# Patient Record
Sex: FEMALE | Race: WHITE | NOT HISPANIC OR LATINO | Employment: OTHER | URBAN - METROPOLITAN AREA
[De-identification: names, ages, dates, MRNs, and addresses within clinical notes are randomized per-mention and may not be internally consistent; named-entity substitution may affect disease eponyms.]

---

## 2021-10-08 ENCOUNTER — HOSPITAL ENCOUNTER (OUTPATIENT)
Dept: NON INVASIVE DIAGNOSTICS | Facility: CLINIC | Age: 76
Discharge: HOME | End: 2021-10-08
Payer: COMMERCIAL

## 2021-10-08 DIAGNOSIS — Z00.00 ENCOUNTER FOR GENERAL ADULT MEDICAL EXAMINATION WITHOUT ABNORMAL FINDINGS: ICD-10-CM

## 2021-10-08 PROCEDURE — 93922 UPR/L XTREMITY ART 2 LEVELS: CPT

## 2021-10-09 PROCEDURE — VASC: Performed by: SURGERY

## 2024-11-10 ENCOUNTER — HOSPITAL ENCOUNTER (INPATIENT)
Facility: HOSPITAL | Age: 79
LOS: 6 days | Discharge: HOME HEALTH | DRG: 637 | End: 2024-11-16
Attending: EMERGENCY MEDICINE | Admitting: INTERNAL MEDICINE
Payer: COMMERCIAL

## 2024-11-10 ENCOUNTER — APPOINTMENT (EMERGENCY)
Dept: RADIOLOGY | Facility: HOSPITAL | Age: 79
DRG: 637 | End: 2024-11-10
Payer: COMMERCIAL

## 2024-11-10 ENCOUNTER — APPOINTMENT (INPATIENT)
Dept: RADIOLOGY | Facility: HOSPITAL | Age: 79
DRG: 637 | End: 2024-11-10
Payer: COMMERCIAL

## 2024-11-10 DIAGNOSIS — R07.9 EXERTIONAL CHEST PAIN: ICD-10-CM

## 2024-11-10 DIAGNOSIS — R41.82 ALTERED MENTAL STATUS: ICD-10-CM

## 2024-11-10 DIAGNOSIS — E16.2 HYPOGLYCEMIA: ICD-10-CM

## 2024-11-10 DIAGNOSIS — R29.90 STROKE-LIKE SYMPTOMS: ICD-10-CM

## 2024-11-10 DIAGNOSIS — Z78.9 NATIONAL INSTITUTES OF HEALTH (NIH) STROKE SCALE LEVEL OF CONSCIOUSNESS SCORE 0, ALERT; KEENLY RESPONSIVE: Primary | ICD-10-CM

## 2024-11-10 DIAGNOSIS — G93.41 METABOLIC ENCEPHALOPATHY: ICD-10-CM

## 2024-11-10 DIAGNOSIS — I25.119 CORONARY ARTERY DISEASE INVOLVING NATIVE CORONARY ARTERY OF NATIVE HEART WITH ANGINA PECTORIS (HCC): ICD-10-CM

## 2024-11-10 PROBLEM — R63.4 WEIGHT LOSS: Status: ACTIVE | Noted: 2024-11-10

## 2024-11-10 PROBLEM — I10 HYPERTENSION, ESSENTIAL: Status: ACTIVE | Noted: 2022-02-06

## 2024-11-10 PROBLEM — J45.909 ASTHMA: Status: ACTIVE | Noted: 2023-06-19

## 2024-11-10 PROBLEM — D64.9 ANEMIA: Status: ACTIVE | Noted: 2024-11-10

## 2024-11-10 PROBLEM — E78.5 HYPERLIPIDEMIA: Status: ACTIVE | Noted: 2024-11-10

## 2024-11-10 PROBLEM — F32.A DEPRESSION: Status: ACTIVE | Noted: 2024-11-10

## 2024-11-10 PROBLEM — Z86.79 HISTORY OF SUBDURAL HEMATOMA: Status: ACTIVE | Noted: 2023-06-21

## 2024-11-10 PROBLEM — I47.10 PAROXYSMAL SVT (SUPRAVENTRICULAR TACHYCARDIA) (HCC): Status: ACTIVE | Noted: 2023-06-21

## 2024-11-10 PROBLEM — E11.9 TYPE 2 DIABETES MELLITUS, WITH LONG-TERM CURRENT USE OF INSULIN (HCC): Status: ACTIVE | Noted: 2024-11-10

## 2024-11-10 PROBLEM — Z79.4 TYPE 2 DIABETES MELLITUS, WITH LONG-TERM CURRENT USE OF INSULIN (HCC): Status: ACTIVE | Noted: 2024-11-10

## 2024-11-10 PROBLEM — K21.9 ACID REFLUX: Status: ACTIVE | Noted: 2023-06-21

## 2024-11-10 LAB
2HR DELTA HS TROPONIN: 12 NG/L
4HR DELTA HS TROPONIN: 13 NG/L
ANION GAP SERPL CALCULATED.3IONS-SCNC: 5 MMOL/L (ref 4–13)
APTT PPP: 27 SECONDS (ref 23–34)
ATRIAL RATE: 99 BPM
BUN SERPL-MCNC: 17 MG/DL (ref 5–25)
CALCIUM SERPL-MCNC: 7.4 MG/DL (ref 8.4–10.2)
CARDIAC TROPONIN I PNL SERPL HS: 11 NG/L
CARDIAC TROPONIN I PNL SERPL HS: 23 NG/L
CARDIAC TROPONIN I PNL SERPL HS: 24 NG/L
CHLORIDE SERPL-SCNC: 103 MMOL/L (ref 96–108)
CO2 SERPL-SCNC: 25 MMOL/L (ref 21–32)
CREAT SERPL-MCNC: 0.89 MG/DL (ref 0.6–1.3)
ERYTHROCYTE [DISTWIDTH] IN BLOOD BY AUTOMATED COUNT: 13.9 % (ref 11.6–15.1)
GFR SERPL CREATININE-BSD FRML MDRD: 62 ML/MIN/1.73SQ M
GLUCOSE SERPL-MCNC: 123 MG/DL (ref 65–140)
GLUCOSE SERPL-MCNC: 170 MG/DL (ref 65–140)
GLUCOSE SERPL-MCNC: 198 MG/DL (ref 65–140)
GLUCOSE SERPL-MCNC: 234 MG/DL (ref 65–140)
GLUCOSE SERPL-MCNC: 238 MG/DL (ref 65–140)
GLUCOSE SERPL-MCNC: 318 MG/DL (ref 65–140)
GLUCOSE SERPL-MCNC: 37 MG/DL (ref 65–140)
GLUCOSE SERPL-MCNC: 39 MG/DL (ref 65–140)
HCT VFR BLD AUTO: 30.8 % (ref 34.8–46.1)
HGB BLD-MCNC: 10 G/DL (ref 11.5–15.4)
INR PPP: 1.08 (ref 0.85–1.19)
MCH RBC QN AUTO: 29.3 PG (ref 26.8–34.3)
MCHC RBC AUTO-ENTMCNC: 32.5 G/DL (ref 31.4–37.4)
MCV RBC AUTO: 90 FL (ref 82–98)
P AXIS: 65 DEGREES
PLATELET # BLD AUTO: 325 THOUSANDS/UL (ref 149–390)
PMV BLD AUTO: 9.5 FL (ref 8.9–12.7)
POTASSIUM SERPL-SCNC: 4.4 MMOL/L (ref 3.5–5.3)
PR INTERVAL: 142 MS
PROTHROMBIN TIME: 14.3 SECONDS (ref 12.3–15)
QRS AXIS: 64 DEGREES
QRSD INTERVAL: 94 MS
QT INTERVAL: 314 MS
QTC INTERVAL: 402 MS
RBC # BLD AUTO: 3.41 MILLION/UL (ref 3.81–5.12)
SODIUM SERPL-SCNC: 133 MMOL/L (ref 135–147)
T WAVE AXIS: 217 DEGREES
VENTRICULAR RATE: 99 BPM
WBC # BLD AUTO: 7.63 THOUSAND/UL (ref 4.31–10.16)

## 2024-11-10 PROCEDURE — 70498 CT ANGIOGRAPHY NECK: CPT

## 2024-11-10 PROCEDURE — 93010 ELECTROCARDIOGRAM REPORT: CPT | Performed by: INTERNAL MEDICINE

## 2024-11-10 PROCEDURE — 74176 CT ABD & PELVIS W/O CONTRAST: CPT

## 2024-11-10 PROCEDURE — 71250 CT THORAX DX C-: CPT

## 2024-11-10 PROCEDURE — 70496 CT ANGIOGRAPHY HEAD: CPT

## 2024-11-10 PROCEDURE — 85027 COMPLETE CBC AUTOMATED: CPT | Performed by: EMERGENCY MEDICINE

## 2024-11-10 PROCEDURE — 82948 REAGENT STRIP/BLOOD GLUCOSE: CPT

## 2024-11-10 PROCEDURE — 84484 ASSAY OF TROPONIN QUANT: CPT | Performed by: EMERGENCY MEDICINE

## 2024-11-10 PROCEDURE — 36415 COLL VENOUS BLD VENIPUNCTURE: CPT | Performed by: EMERGENCY MEDICINE

## 2024-11-10 PROCEDURE — 96365 THER/PROPH/DIAG IV INF INIT: CPT

## 2024-11-10 PROCEDURE — 85610 PROTHROMBIN TIME: CPT | Performed by: EMERGENCY MEDICINE

## 2024-11-10 PROCEDURE — 80048 BASIC METABOLIC PNL TOTAL CA: CPT | Performed by: EMERGENCY MEDICINE

## 2024-11-10 PROCEDURE — 84484 ASSAY OF TROPONIN QUANT: CPT | Performed by: INTERNAL MEDICINE

## 2024-11-10 PROCEDURE — 99223 1ST HOSP IP/OBS HIGH 75: CPT | Performed by: PSYCHIATRY & NEUROLOGY

## 2024-11-10 PROCEDURE — 96375 TX/PRO/DX INJ NEW DRUG ADDON: CPT

## 2024-11-10 PROCEDURE — 85730 THROMBOPLASTIN TIME PARTIAL: CPT | Performed by: EMERGENCY MEDICINE

## 2024-11-10 PROCEDURE — 93005 ELECTROCARDIOGRAM TRACING: CPT

## 2024-11-10 PROCEDURE — 99223 1ST HOSP IP/OBS HIGH 75: CPT | Performed by: INTERNAL MEDICINE

## 2024-11-10 PROCEDURE — 99285 EMERGENCY DEPT VISIT HI MDM: CPT

## 2024-11-10 PROCEDURE — 99291 CRITICAL CARE FIRST HOUR: CPT | Performed by: EMERGENCY MEDICINE

## 2024-11-10 PROCEDURE — 99222 1ST HOSP IP/OBS MODERATE 55: CPT | Performed by: INTERNAL MEDICINE

## 2024-11-10 RX ORDER — ASPIRIN 81 MG/1
81 TABLET, CHEWABLE ORAL DAILY
Status: DISCONTINUED | OUTPATIENT
Start: 2024-11-10 | End: 2024-11-16 | Stop reason: HOSPADM

## 2024-11-10 RX ORDER — ASPIRIN 81 MG/1
243 TABLET ORAL ONCE
Status: COMPLETED | OUTPATIENT
Start: 2024-11-10 | End: 2024-11-10

## 2024-11-10 RX ORDER — BUPROPION HYDROCHLORIDE 150 MG/1
150 TABLET ORAL DAILY
COMMUNITY

## 2024-11-10 RX ORDER — DEXTROSE MONOHYDRATE AND SODIUM CHLORIDE 5; .9 G/100ML; G/100ML
75 INJECTION, SOLUTION INTRAVENOUS CONTINUOUS
Status: DISCONTINUED | OUTPATIENT
Start: 2024-11-10 | End: 2024-11-11

## 2024-11-10 RX ORDER — OLANZAPINE 15 MG/1
15 TABLET ORAL
COMMUNITY

## 2024-11-10 RX ORDER — BUPROPION HYDROCHLORIDE 150 MG/1
150 TABLET ORAL DAILY
Status: DISCONTINUED | OUTPATIENT
Start: 2024-11-11 | End: 2024-11-16 | Stop reason: HOSPADM

## 2024-11-10 RX ORDER — LISINOPRIL 10 MG/1
10 TABLET ORAL DAILY
COMMUNITY
End: 2024-11-16

## 2024-11-10 RX ORDER — ATORVASTATIN CALCIUM 40 MG/1
40 TABLET, FILM COATED ORAL DAILY
COMMUNITY

## 2024-11-10 RX ORDER — DEXTROSE MONOHYDRATE AND SODIUM CHLORIDE 5; .9 G/100ML; G/100ML
75 INJECTION, SOLUTION INTRAVENOUS CONTINUOUS
Status: DISCONTINUED | OUTPATIENT
Start: 2024-11-10 | End: 2024-11-10

## 2024-11-10 RX ORDER — DEXTROSE MONOHYDRATE 25 G/50ML
INJECTION, SOLUTION INTRAVENOUS
Status: COMPLETED
Start: 2024-11-10 | End: 2024-11-10

## 2024-11-10 RX ORDER — DEXTROSE MONOHYDRATE 25 G/50ML
25 INJECTION, SOLUTION INTRAVENOUS ONCE
Status: COMPLETED | OUTPATIENT
Start: 2024-11-10 | End: 2024-11-10

## 2024-11-10 RX ORDER — ONDANSETRON 2 MG/ML
4 INJECTION INTRAMUSCULAR; INTRAVENOUS ONCE
Status: COMPLETED | OUTPATIENT
Start: 2024-11-10 | End: 2024-11-10

## 2024-11-10 RX ORDER — MELOXICAM 7.5 MG/1
7.5 TABLET ORAL DAILY PRN
COMMUNITY

## 2024-11-10 RX ORDER — MONTELUKAST SODIUM 10 MG/1
10 TABLET ORAL
COMMUNITY

## 2024-11-10 RX ORDER — MONTELUKAST SODIUM 10 MG/1
10 TABLET ORAL
Status: DISCONTINUED | OUTPATIENT
Start: 2024-11-10 | End: 2024-11-16 | Stop reason: HOSPADM

## 2024-11-10 RX ORDER — ACETAMINOPHEN 325 MG/1
650 TABLET ORAL EVERY 4 HOURS PRN
Status: DISCONTINUED | OUTPATIENT
Start: 2024-11-10 | End: 2024-11-16 | Stop reason: HOSPADM

## 2024-11-10 RX ORDER — ATORVASTATIN CALCIUM 40 MG/1
40 TABLET, FILM COATED ORAL EVERY EVENING
Status: DISCONTINUED | OUTPATIENT
Start: 2024-11-10 | End: 2024-11-16 | Stop reason: HOSPADM

## 2024-11-10 RX ORDER — REPAGLINIDE 1 MG/1
1 TABLET ORAL
COMMUNITY
End: 2024-11-16

## 2024-11-10 RX ORDER — HEPARIN SODIUM 5000 [USP'U]/ML
5000 INJECTION, SOLUTION INTRAVENOUS; SUBCUTANEOUS EVERY 8 HOURS SCHEDULED
Status: DISCONTINUED | OUTPATIENT
Start: 2024-11-10 | End: 2024-11-16 | Stop reason: HOSPADM

## 2024-11-10 RX ORDER — ONDANSETRON 2 MG/ML
INJECTION INTRAMUSCULAR; INTRAVENOUS
Status: COMPLETED
Start: 2024-11-10 | End: 2024-11-10

## 2024-11-10 RX ADMIN — OLANZAPINE 15 MG: 10 TABLET, FILM COATED ORAL at 23:22

## 2024-11-10 RX ADMIN — DEXTROSE MONOHYDRATE 25 ML: 25 INJECTION, SOLUTION INTRAVENOUS at 17:19

## 2024-11-10 RX ADMIN — ATORVASTATIN CALCIUM 40 MG: 40 TABLET, FILM COATED ORAL at 17:26

## 2024-11-10 RX ADMIN — DEXTROSE AND SODIUM CHLORIDE 75 ML/HR: 5; .9 INJECTION, SOLUTION INTRAVENOUS at 09:06

## 2024-11-10 RX ADMIN — MONTELUKAST 10 MG: 10 TABLET, FILM COATED ORAL at 23:22

## 2024-11-10 RX ADMIN — HEPARIN SODIUM 5000 UNITS: 5000 INJECTION INTRAVENOUS; SUBCUTANEOUS at 23:25

## 2024-11-10 RX ADMIN — ASPIRIN 243 MG: 81 TABLET, COATED ORAL at 14:39

## 2024-11-10 RX ADMIN — ASPIRIN 81 MG CHEWABLE TABLET 81 MG: 81 TABLET CHEWABLE at 13:12

## 2024-11-10 RX ADMIN — DEXTROSE MONOHYDRATE 50 ML: 25 INJECTION, SOLUTION INTRAVENOUS at 08:40

## 2024-11-10 RX ADMIN — DEXTROSE AND SODIUM CHLORIDE 40 ML/HR: 5; .9 INJECTION, SOLUTION INTRAVENOUS at 13:13

## 2024-11-10 RX ADMIN — IOHEXOL 85 ML: 350 INJECTION, SOLUTION INTRAVENOUS at 08:52

## 2024-11-10 RX ADMIN — HEPARIN SODIUM 5000 UNITS: 5000 INJECTION INTRAVENOUS; SUBCUTANEOUS at 13:13

## 2024-11-10 RX ADMIN — ONDANSETRON 4 MG: 2 INJECTION INTRAMUSCULAR; INTRAVENOUS at 08:51

## 2024-11-10 RX ADMIN — SODIUM CHLORIDE 1000 ML: 0.9 INJECTION, SOLUTION INTRAVENOUS at 08:40

## 2024-11-10 NOTE — ASSESSMENT & PLAN NOTE
Lab Results   Component Value Date    HGBA1C 8.7 (H) 08/15/2023       Recent Labs     11/10/24  0833 11/10/24  0939 11/10/24  1216   POCGLU 39* 238* 123       Blood Sugar Average: Last 72 hrs:  (P) [id number]  With hypoglycemia as above  Home regimen: Lantus 19 hs, Repaglinide 1 mg TID  Hold Lantus/Repaglinide  Blood sugars being monitored  No sliding scale insulin at present

## 2024-11-10 NOTE — ASSESSMENT & PLAN NOTE
Weight loss of around 7 lbs since some months  Progressively worsening loss of appetite, abdominal bloating  F/u CT C/A/P

## 2024-11-10 NOTE — ED PROVIDER NOTES
Time reflects when diagnosis was documented in both MDM as applicable and the Disposition within this note       Time User Action Codes Description Comment    11/10/2024  8:21 AM Marlyn Pereyra Add [Z78.9] National Institutes of Health (NIH) Stroke Scale level of consciousness score 0, alert; keenly responsive     11/10/2024  9:25 AM Bowen Phoenix [R41.82] Altered mental status     11/10/2024  9:25 AM Bowen Phoenix [E16.2] Hypoglycemia           ED Disposition       ED Disposition   Admit    Condition   Stable    Date/Time   Sun Nov 10, 2024  9:25 AM    Comment                  Assessment & Plan       Medical Decision Making  Patient 79-year-old female presenting as a stroke alert from waiting room/triage.  DDx: Altered mental status hypoglycemia, stroke alert, ACS, arrhythmia, electrolyte normality, anemia, dissection  Based on patient presentation and physical exam findings, primary concern is for prehospital stroke alert however given finding of hypoglycemia, primary concern is for hypoglycemia as main cause of mental status/presentation.  Amp of D50 given, patient started on D5 normal saline drip.  Will plan for full stroke workup, no acute findings on CT or CTA head neck.  Discussed with neurology, plans for admission to medicine for stroke pathway, continued management of hypoglycemia and MRI in the morning.    Problems Addressed:  Altered mental status: acute illness or injury  Hypoglycemia: acute illness or injury    Amount and/or Complexity of Data Reviewed  Labs: ordered. Decision-making details documented in ED Course.  Radiology: ordered.    Risk  Prescription drug management.  Decision regarding hospitalization.        ED Course as of 11/10/24 1103   [place] Nov 10, 2024   0846 POC Glucose(!!): 39  D50 amp given         Medications   dextrose 5 % and sodium chloride 0.9 % infusion (75 mL/hr Intravenous New Bag 11/10/24 0906)   dextrose 50 % IV solution 25 mL (50 mL Intravenous Given 11/10/24 0840)    ondansetron (ZOFRAN) injection 4 mg (4 mg Intravenous Given 11/10/24 0851)   sodium chloride 0.9 % bolus 1,000 mL (1,000 mL Intravenous New Bag 11/10/24 0840)   iohexol (OMNIPAQUE) 350 MG/ML injection (MULTI-DOSE) 100 mL (85 mL Intravenous Given 11/10/24 0852)       ED Risk Strat Scores                                              History of Present Illness       Chief Complaint   Patient presents with    STROKE Alert     Right side weakness. LKW 6pm yesterday       No past medical history on file.   No past surgical history on file.   No family history on file.       No existing history information found.   No existing history information found.   I have reviewed and agree with the history as documented.     HPI  Patient is 79-year-old female presenting for concerns of stroke alert from waiting room.  Patient unable to provide any history at present.  Past medical history significant for A-fib, not on any apparent blood thinners per .  Is able to tell us her name and intermittently nod to questions.  History obtained from .  Per , has had waxing waning right-sided weakness for the last several days and this morning woke up with slurred speech.  Patient is not on any reported thinners.  And route to CT, patient's blood sugar was noted to be 39.  IV placed and amp of D50 given.  Stroke protocol followed.  Review of Systems   Unable to perform ROS: Mental status change           Objective       ED Triage Vitals   Temperature Pulse Blood Pressure Respirations SpO2 Patient Position - Orthostatic VS   11/10/24 0900 11/10/24 0826 11/10/24 0826 11/10/24 0826 11/10/24 0826 11/10/24 0845   97.9 °F (36.6 °C) 88 142/73 16 99 % Lying      Temp Source Heart Rate Source BP Location FiO2 (%) Pain Score    11/10/24 0900 11/10/24 0826 -- -- --    Oral Monitor         Vitals      Date and Time Temp Pulse SpO2 Resp BP Pain Score FACES Pain Rating User   11/10/24 1000 -- 88 99 % 18 116/59 -- -- HB   11/10/24 0900  97.9 °F (36.6 °C) 97 98 % 18 133/72 -- -- HB   11/10/24 0845 -- 77 98 % 18 215/81 -- -- HB   11/10/24 0830 -- 77 98 % 18 215/81 -- -- HB   11/10/24 0826 -- 88 99 % 16 142/73 -- -- HB            Physical Exam  Vitals and nursing note reviewed.   Constitutional:       Appearance: She is ill-appearing.   HENT:      Head: Normocephalic and atraumatic.      Right Ear: Tympanic membrane, ear canal and external ear normal.      Left Ear: Tympanic membrane, ear canal and external ear normal.      Nose: Nose normal.      Mouth/Throat:      Mouth: Mucous membranes are moist.      Pharynx: Oropharynx is clear.   Eyes:      Extraocular Movements: Extraocular movements intact.      Conjunctiva/sclera: Conjunctivae normal.      Pupils: Pupils are equal, round, and reactive to light.      Comments: Patient is able to track, pupils equal and reactive, full range of EOM,   Cardiovascular:      Rate and Rhythm: Normal rate and regular rhythm.      Pulses: Normal pulses.      Heart sounds: Normal heart sounds.   Pulmonary:      Effort: Pulmonary effort is normal.      Breath sounds: Normal breath sounds.   Abdominal:      General: Abdomen is flat. Bowel sounds are normal.      Palpations: Abdomen is soft.      Tenderness: There is no abdominal tenderness. There is no guarding or rebound.   Musculoskeletal:         General: Normal range of motion.      Cervical back: Normal range of motion and neck supple.   Skin:     General: Skin is warm and dry.      Capillary Refill: Capillary refill takes less than 2 seconds.   Neurological:      Comments: Patient appears to have right upper and right lower extremity weakness however is able to move against gravity.  Difficult to ascertain if patient has any sensory deficits given patient's altered mental status.  No noted facial droop, patient was able to stick her tongue out midline and give symmetric smile.         Results Reviewed       Procedure Component Value Units Date/Time    HS Troponin  I 4hr [[id number]]     Lab Status: No result Specimen: Blood     Fingerstick Glucose (POCT) [[id number]]  (Abnormal) Collected: 11/10/24 0939    Lab Status: Final result Specimen: Blood Updated: 11/10/24 0940     POC Glucose 238 mg/dl     HS Troponin I 2hr [[id number]]     Lab Status: No result Specimen: Blood     HS Troponin 0hr (reflex protocol) [[id number]]  (Normal) Collected: 11/10/24 0903    Lab Status: Final result Specimen: Blood from Arm, Left Updated: 11/10/24 0939     hs TnI 0hr 11 ng/L     Basic metabolic panel [[id number]]  (Abnormal) Collected: 11/10/24 0903    Lab Status: Final result Specimen: Blood from Arm, Left Updated: 11/10/24 0933     Sodium 133 mmol/L      Potassium 4.4 mmol/L      Chloride 103 mmol/L      CO2 25 mmol/L      ANION GAP 5 mmol/L      BUN 17 mg/dL      Creatinine 0.89 mg/dL      Glucose 318 mg/dL      Calcium 7.4 mg/dL      eGFR 62 ml/min/1.73sq m     Narrative:      National Kidney Disease Foundation guidelines for Chronic Kidney Disease (CKD):     Stage 1 with normal or high GFR (GFR > 90 mL/min/1.73 square meters)    Stage 2 Mild CKD (GFR = 60-89 mL/min/1.73 square meters)    Stage 3A Moderate CKD (GFR = 45-59 mL/min/1.73 square meters)    Stage 3B Moderate CKD (GFR = 30-44 mL/min/1.73 square meters)    Stage 4 Severe CKD (GFR = 15-29 mL/min/1.73 square meters)    Stage 5 End Stage CKD (GFR <15 mL/min/1.73 square meters)  Note: GFR calculation is accurate only with a steady state creatinine    Protime-INR [[id number]]  (Normal) Collected: 11/10/24 0903    Lab Status: Final result Specimen: Blood from Arm, Left Updated: 11/10/24 0928     Protime 14.3 seconds      INR 1.08    Narrative:      INR Therapeutic Range    Indication                                             INR Range      Atrial Fibrillation                                               2.0-3.0  Hypercoagulable State                                    2.0.2.3  Left Ventricular Asist Device                             2.0-3.0  Mechanical Heart Valve                                  -    Aortic(with afib, MI, embolism, HF, LA enlargement,    and/or coagulopathy)                                     2.0-3.0 (2.5-3.5)     Mitral                                                             2.5-3.5  Prosthetic/Bioprosthetic Heart Valve               2.0-3.0  Venous thromboembolism (VTE: VT, PE        2.0-3.0    APTT [[id number]]  (Normal) Collected: 11/10/24 0903    Lab Status: Final result Specimen: Blood from Arm, Left Updated: 11/10/24 0928     PTT 27 seconds     CBC and Platelet [[id number]]  (Abnormal) Collected: 11/10/24 0903    Lab Status: Final result Specimen: Blood from Arm, Left Updated: 11/10/24 0923     WBC 7.63 Thousand/uL      RBC 3.41 Million/uL      Hemoglobin 10.0 g/dL      Hematocrit 30.8 %      MCV 90 fL      MCH 29.3 pg      MCHC 32.5 g/dL      RDW 13.9 %      Platelets 325 Thousands/uL      MPV 9.5 fL     Fingerstick Glucose (POCT) [[id number]]  (Abnormal) Collected: 11/10/24 0833    Lab Status: Final result Specimen: Blood Updated: 11/10/24 0834     POC Glucose 39 mg/dl             CTA stroke alert (head/neck)   Final Interpretation by Joseph Maldonado MD (11/10 0901)         1. Moderate stenoses of the origins of both vertebral arteries with the vessel is patent distally.   2. Moderate stenosis of the left subclavian artery origin approximately 50%.   3. Carotid arterial system is patent with mild atherosclerotic disease.   4. No intracranial aneurysm or major intracranial arterial stenosis.               Workstation performed: [id number]         CT stroke alert brain   Final Interpretation by Joseph Maldonado MD (11/10 0836)         1. No acute intracranial hemorrhage, mass effect or edema.   2. Right pterional craniotomy noted.            I personally discussed this study with SHAMEKA GE and Alie Gutierrez and Donta Boyd  on 11/10/2024 8:34 AM.            Workstation performed: [id number]              ECG 12 Lead Documentation Only    Date/Time: 11/10/2024 11:01 AM    Performed by: Bowen Phoenix MD  Authorized by: Bowen Phoenix MD    Indications / Diagnosis:  Stroke alert  ECG reviewed by me, the ED Provider: yes    Patient location:  ED  Interpretation:     Interpretation: normal    Rate:     ECG rate assessment: normal    Rhythm:     Rhythm: sinus rhythm    Ectopy:     Ectopy: none    QRS:     QRS axis:  Normal    QRS intervals:  Normal  Conduction:     Conduction: normal    ST segments:     ST segments:  Non-specific  T waves:     T waves: normal        ED Medication and Procedure Management   None     Patient's Medications    No medications on file     No discharge procedures on file.  ED SEPSIS DOCUMENTATION   Time reflects when diagnosis was documented in both MDM as applicable and the Disposition within this note       Time User Action Codes Description Comment    11/10/2024  8:21 AM Marlyn Pereyra Add [Z78.9] National Institutes of Health (NIH) Stroke Scale level of consciousness score 0, alert; keenly responsive     11/10/2024  9:25 AM Bowen Phoenix [R41.82] Altered mental status     11/10/2024  9:25 AM Bowen Phoenix [E16.2] Hypoglycemia                  Bowen Phoenix MD  11/10/24 1103

## 2024-11-10 NOTE — ASSESSMENT & PLAN NOTE
Complains of retrosternal chest pressure with shortness of breath and relieved with rest with occasional episodes at rest since a few months  H/o CAD - NSTEMI in 2022, cath with 50% stenosis of proximal RCA  EKG - mild inferolateral ST depression  Trop - neg x 3  Ct ASA, statin  BB when normotension permitted by neurology  F/u echo  Cardiology input appreciated

## 2024-11-10 NOTE — CONSULTS
Consultation - Neurology   Name: Tavia Eldridge 79 y.o. female I MRN: [id number]  Unit/Bed#: ED 21 I Date of Admission: 11/10/2024   Date of Service: 11/10/2024 I Hospital Day: 0   Consult to Neurology  Consult performed by: Donta Boyd DO  Consult ordered by: Ashwin Lopes DO        Physician Requesting Evaluation: Aubrie Stroud MD   Reason for Evaluation / Principal Problem: Stroke alert    Assessment & Plan  Stroke-like symptoms  79 y.o. female with pertinent PMH of type 2 diabetes, hypertension, anxiety, CAD, NSTEMI history. Stroke alert activated on 11/10/24 at 0818 for sxs concerning for stroke consisting of dysarthria, right sided weakness. LKW on 11/9/2024 at 6 PM where her  notes that the patient started having some altered mental status, the right sided weakness and sensory deficits began today however patient notes that she has had waxing and waning weakness over the past 3 days. Initial NIHSS 3 (patient missed both LOC questions and has a mild right facial droop.). Initial BP Blood Pressure: 142/73, FSBG POC Glucose: (!) 39 (RN NOTIFIED/CRIT IZABELLA). NC CTH negative and CTA H/N negative for LVO/high-grade stenosis. As a result of Symptoms resolved/clearly non disabling patient was not determined to be a candidate for thrombolysis (TNK). Given absence of IR target pt was deemed not a candidate for mechanical thrombectomy.   - Home Antiplatelet /Anticoagulants PTA: no antiplatelet or anticoagulants  - Stroke risk factors: HTN, DM, and Coronary Artery Disease  - Prior Stroke Hx: none  - Past Neurological Hx: neurosurgical procedures -right frontotemporal craniotomy from a questionable traumatic bleed    Workup:  - CTH: No acute intracranial pathology, prior right pterional craniotomy  - CTA: Moderate stenoses of the origins of both vertebral arteries, moderate stenosis of the left subclavian artery at 50%  - MRI: Pending  - JAMAL: Pending  - Labs: Hemoglobin A1c 8.7 (8/15/2023), LDL No  results in last 5 years (No results in last 5 years)    Impression: 79 y.o. female with history of  type 2 diabetes, hypertension, anxiety, CAD, NSTEMI, SVT, right pterional craniotomy currently on  no AP/AC  who presented with right-sided weakness and numbness as well as dysarthria.  NIH 3.  Blood glucose was found to be 39.  Patient also had some nausea and vomiting while at CT scanner.  Patient's symptoms are likely metabolic in nature, will rule out stroke with MRI brain.    Plan: Discussed plan with neurology attending, Dr. Mares  Admit along the stroke pathway with telemetry monitoring  Strict NPO prior to dysphagia screen   Frequent neuro checks per protocol. If there is any acute changes in exam, please obtain stat CT head and notify neurology team  BP Goals: Permissive hypertension - SBP<220/110 for first 24 hrs.   Antiplatelet agents: Load  mg, then 81 mg daily.   Anticoagulation: None  Statin: Continue home statin, Lipitor, at 40 mg daily   Labs: Hgb A1c, LDL   Brain Imaging: Obtain MRI brain without contrast  TTE, pending  Euthermic/Euglycemic  DVT PPx: Per primary, SCDs  PT/OT/ST/PMR evaluations when able  Stroke education and counseling  Rest of other care per primary team appreciated    Disposition: PT Recommendations -      I have discussed with Dr. Mares the above plan to treat pt. She agrees with the plan.    Thrombolytic Decision: Patient not a candidate. Symptoms resolved/clearly non disabling.    Recommendations for outpatient neurological follow up have yet to be determined.    History of Present Illness   Hx and PE limited by: none  Patient last known well: 11/9/24 1800  Stroke alert called: 08  Neurology time of arrival: Immediate  HPI: Tavia Eldridge is a 79 y.o. female who presents with waxing and waning right upper and lower extremity weakness and numbness for the past 3 days.  She is accompanied by her daughter and  who notes that the patient was completely normal  yesterday up until 6 PM where she started having some altered mental status and confusion.  This lasted through the night.  She then had the right-sided weakness and numbness recur this morning.  Patient was activated as a stroke alert from triage.  She was found to have a low blood sugar at 39.  While on CT scanner she had some emesis.  CT and CTA head and neck were unremarkable for any acute pathologies.  While in the patient room it was confirmed that the patient does seem to have a new right facial droop, mild.  She otherwise did not have any obvious true neurologic deficits.  Patient's  also mentions that Prandin is a new diabetic medication for her but is unsure of when she started it.  Neurologically patient seems to have a history of prior intracranial hemorrhage after questionable trauma requiring right frontotemporal craniotomy.  Otherwise she denies any other history of stroke.  Patient initially says she had a history of A-fib however upon chart review no mention of A-fib can be found, instead there was a history of SVT.      Review of Systems   Unable to perform ROS: Acuity of condition     I have reviewed the patient's PMH, PSH, Social History, Family History, Meds, and Allergies    Objective :  Temp:  [97.9 °F (36.6 °C)] 97.9 °F (36.6 °C)  HR:  [77-97] 82  BP: (116-215)/(57-81) 122/57  Resp:  [12-18] 12  SpO2:  [97 %-99 %] 98 %  O2 Device: None (Room air)    Physical Exam  Constitutional:       General: She is not in acute distress.     Appearance: Normal appearance. She is not ill-appearing.   HENT:      Head: Normocephalic.      Right Ear: External ear normal.      Left Ear: External ear normal.      Mouth/Throat:      Mouth: Mucous membranes are moist.   Eyes:      General: Lids are normal.      Extraocular Movements: Extraocular movements intact.      Conjunctiva/sclera: Conjunctivae normal.   Neurological:      Mental Status: She is alert.      Motor: Motor strength is normal.      Coordination: Coordination is intact.     Neurological Exam  Mental Status  Alert. Oriented only to person.    Cranial Nerves  CN II: Visual fields full to confrontation.  CN III, IV, VI: Extraocular movements intact bilaterally. Normal lids and orbits bilaterally.  CN V: Facial sensation is normal.  CN VII:  Right: There is central facial weakness.  CN VIII: Hearing is normal.    Motor   Strength is 5/5 throughout all four extremities.    Sensory  Patient stated decrease sensation to pinprick on the upper right extremity however also states decreased sensation to her back to the left lower extremity..    Coordination    Finger-to-nose, rapid alternating movements and heel-to-shin normal bilaterally without dysmetria.      NIHSS:  1a.Level of Consciousness: 0 = Alert   1b. LOC Questions: 2 = Answers neither correctly   1c. LOC Commands: 0 = Obeys both correctly   2. Best Gaze: 0 = Normal   3. Visual: 0 = No visual field loss   4. Facial Palsy: 1=Minor paralysis (flattened nasolabial fold, asymmetric on smiling)   5a. Motor Right Arm: 0=No drift, limb holds 90 (or 45) degrees for full 10 seconds   5b. Motor Left Arm: 0=No drift, limb holds 90 (or 45) degrees for full 10 seconds   6a. Motor Right Le=No drift, limb holds 90 (or 45) degrees for full 10 seconds   6b. Motor Left Le=No drift, limb holds 90 (or 45) degrees for full 10 seconds   7. Limb Ataxia:  0=Absent   8. Sensory: 0=Normal; no sensory loss   9. Best Language:  0=No aphasia, normal   10. Dysarthria: 0=Normal articulation   11. Extinction and Inattention (formerly Neglect): 0=No abnormality   Total Score: 3     Time NIHSS was completed: Immediate    Modified Lui Score:  1 (No significant disability. Able to carry out all usual activities, despite some symptoms)      Lab Results: I have reviewed the following results:CBC/BMP:   .     11/10/24  0903   WBC 7.63   HGB 10.0*   HCT 30.8*      SODIUM 133*   K 4.4      CO2 25   BUN 17    CREATININE 0.89   GLUC 318*        Imaging Results Review: I personally reviewed the following image studies in PACS and associated radiology reports: CT head. My interpretation of the radiology images/reports is: No acute pathology.  Other Study Results Review: No additional pertinent studies reviewed.    VTE Prophylaxis: Per primary    Code Status: Level 1 - Full Code  Advance Directive and Living Will:      Power of :    POLST:

## 2024-11-10 NOTE — ASSESSMENT & PLAN NOTE
Lipid panel (June 21, 2023): Cholesterol 275 mg/dL, Triglycerides 194 mg/dL, HDL 44 and LDL of 192  Continue statin

## 2024-11-10 NOTE — ED ATTENDING ATTESTATION
11/10/2024  I, Ashwin Lopes DO, saw and evaluated the patient. I have discussed the patient with the resident/non-physician practitioner and agree with the resident's/non-physician practitioner's findings, Plan of Care, and MDM as documented in the resident's/non-physician practitioner's note, except where noted. All available labs and Radiology studies were reviewed.  I was present for key portions of any procedure(s) performed by the resident/non-physician practitioner and I was immediately available to provide assistance.       At this point I agree with the current assessment done in the Emergency Department.  I have conducted an independent evaluation of this patient a history and physical is as follows:    ED Course  ED Course as of 11/10/24 1027   Sun Nov 10, 2024   0826 79-year-old female presents emergency department with dysarthria, symptoms starting this morning, she was also complaining that she was unable to move her right upper extremity and right lower extremity, patient does not have any complaints of pain at this point in time she is able to move right lower upper and right lower extremity very little against gravity at this point in time, intent tach sensation, she is able to follow commands, noted slurred speech on examination, no other complaints at this point in time, speaking with  she went to bed last night last known well was approximately 10 PM.    Patient is not a TNK candidate at this point in time, stroke alert called from the waiting room, CTA head and neck to be performed as well as labs,         Critical Care Time  CriticalCare Time    Date/Time: 11/10/2024 10:27 AM    Performed by: Ashwin Lopes DO  Authorized by: Ashwin Lopes DO    Critical care provider statement:     Critical care time (minutes):  60    Critical care time was exclusive of:  Separately billable procedures and treating other patients and teaching time    Critical care was necessary to treat or prevent  imminent or life-threatening deterioration of the following conditions:  CNS failure or compromise    Critical care was time spent personally by me on the following activities:  Blood draw for specimens, obtaining history from patient or surrogate, development of treatment plan with patient or surrogate, evaluation of patient's response to treatment, examination of patient, ordering and performing treatments and interventions, ordering and review of laboratory studies, ordering and review of radiographic studies, re-evaluation of patient's condition and review of old charts  Comments:      Upon my evaluation, this patient has a high probability of imminent or life-threatening deterioration due to stroke alert which required my direct attention, intervention, and personal management.     I have personally provided 60 minutes of critical care time, exclusive of procedures, teaching, and any prior time recorded by providers other than myself. Time includes review of laboratory data, radiology results, discussion with consultants, and monitoring for potential decompensation.

## 2024-11-10 NOTE — H&P
H&P - Hospitalist   Name: Tavia Eldridge 79 y.o. female I MRN: [id number]  Unit/Bed#: ED 21 I Date of Admission: 11/10/2024   Date of Service: 11/10/2024 I Hospital Day: 0     Assessment & Plan  Stroke-like symptoms  Waxing and waning right sided weakness and numbness since 3 days, confusion since last night. Recurrence of right sided weakness and numbness this morning and she was brought to the ED by her family  Blood glucose 39 on arrival  CT head - No acute intracranial hemorrhage, mass effect or edema. Right pterional craniotomy noted.  CTA head and neck - Moderate stenoses of the origins of both vertebral arteries with the vessel is patent distally. Moderate stenosis of the left subclavian artery origin approximately 50%. Carotid arterial system is patent with mild atherosclerotic disease. No intracranial aneurysm or major intracranial arterial stenosis.   Due to hypoglycemia vs CVA  Seen by neurology - received  mg, then 81 mg daily, ct home Atorvastatin 40 mg daily  MRI brain, echo, FLP, HbA1c, neurochecks  Hypoglycemia  BG 39 on arrival  Received Lantus 19 units yesterday evening  Poor oral intake since around 2 months - worsening  Received 25 ml D50 and on D5NS  Latest blood sugar 123  Regular diet  Ct D5NS at lower rate - 40 ml/hr  Monitor blood sugars   Chest pain  Complains of retrosternal chest pressure with shortness of breath and relieved with rest with occasional episodes at rest since a few months  H/o CAD - NSTEMI in 2022, cath with 50% stenosis of proximal RCA  EKG - mild inferolateral ST depression  Trop - neg x 3  Ct ASA, statin  BB when normotension permitted by neurology  F/u echo  Cardiology input appreciated  Type 2 diabetes mellitus, with long-term current use of insulin ([facility])  Lab Results   Component Value Date    HGBA1C 8.7 (H) 08/15/2023       Recent Labs     11/10/24  0833 11/10/24  0939 11/10/24  1216   POCGLU 39* 238* 123       Blood Sugar Average: Last 72 hrs:  (P)  [id number]  With hypoglycemia as above  Home regimen: Lantus 19 hs, Repaglinide 1 mg TID  Hold Lantus/Repaglinide  Blood sugars being monitored  No sliding scale insulin at present    Coronary artery disease involving native coronary artery of native heart with angina pectoris (HCC)  As above  Asthma  No exacerbation  Ct home Singulair  Albuterol prn  Primary hypertension  Home regimen: Lisinopril 10 mg daily  Hold meds in the setting of possible acute CVA requiring permissive hypertension  Will need BB as above when normotension permitted by neurology  Weight loss/loss of appetite  Weight loss of around 7 lbs since some months  Progressively worsening loss of appetite, abdominal bloating  F/u CT C/A/P  Hyperlipidemia  Ct Atorvastatin 40 mg daily  Check FLP  Anemia  Drop in Hb to 10 from 12 to 13 in 2023  W/u  Depression  Ct home meds Bupropion  mg daily, Olanzapine 15 mg daily  Paroxysmal SVT (supraventricular tachycardia) (HCC)  H/o paroxysmal SVT  Monitor on telemetry  History of subdural hematoma  H/o SDH many years ago - s/p right frontotemporal craniotomy  CT head today - no acute abnormality      VTE Pharmacologic Prophylaxis: VTE Score: 3 Moderate Risk (Score 3-4) - Pharmacological DVT Prophylaxis Ordered: heparin.  Code Status: Level 1 - Full Code   Discussion with family: Updated  () at bedside.    Anticipated Length of Stay: Patient will be admitted on an inpatient basis with an anticipated length of stay of greater than 2 midnights secondary to stroke like symptoms, hypoglycemia, weight loss, chest pain with exertion.    History of Present Illness   Chief Complaint: Right sided weakness, chest pain    Tavia Eldridge is a 79 y.o. female with a PMH of coronary artery disease, hypertension, type 2 diabetes, asthma, depression, SVT, hyperlipidemia, who presents with strokelike symptoms.    She had waxing and waning right-sided weakness and numbness since the past 3 days and  confusion since 6 PM last night.  Her right-sided weakness recurred this morning along with dysarthria and she was hence brought to the ED by her family.    She also complains of lower retrosternal chest pressure with shortness of breath, relieved with rest since a few months.  She has occasional episodes of chest pressure at rest.     She has noted weight loss of around 7 pounds since a few months and progressively worsening appetite.  She also complains of abdominal bloating.  No fever or chills.  She had some nausea and vomiting while at CT but not previously.  No abdominal pain.  No cough.     Review of Systems   Constitutional:  Positive for appetite change and unexpected weight change.   Respiratory:  Positive for shortness of breath.    Cardiovascular:  Positive for chest pain.   Gastrointestinal:         Abdominal bloating   Neurological:  Positive for speech difficulty, weakness and numbness.   Psychiatric/Behavioral:  Positive for confusion.        Historical Information   Past Medical History:   Diagnosis Date    Asthma     Coronary artery disease     Depression     Diabetes mellitus (HCC)     Hyperlipidemia     Hypertension     Subdural hematoma (HCC)     SVT (supraventricular tachycardia) (HCC)      Vaping regularly    Family history:  Nil contributory    Social History:  Marital Status: /Civil Union     Meds/Allergies   I have reviewed home medications with patient family member.  Prior to Admission medications    Medication Sig Start Date End Date Taking? Authorizing Provider   atorvastatin (LIPITOR) 40 mg tablet Take 40 mg by mouth daily   Yes Historical Provider, MD   buPROPion (WELLBUTRIN XL) 150 mg 24 hr tablet Take 150 mg by mouth daily   Yes Historical Provider, MD   lisinopril (ZESTRIL) 10 mg tablet Take 10 mg by mouth daily   Yes Historical Provider, MD   meloxicam (MOBIC) 7.5 mg tablet Take 7.5 mg by mouth daily as needed for moderate pain   Yes Historical Provider, MD   montelukast  (SINGULAIR) 10 mg tablet Take 10 mg by mouth daily at bedtime   Yes Historical Provider, MD   OLANZapine (ZyPREXA) 15 mg tablet Take 15 mg by mouth daily at bedtime   Yes Historical Provider, MD   repaglinide (PRANDIN) 1 mg tablet Take 1 mg by mouth 3 (three) times a day before meals   Yes Historical Provider, MD         Objective :  Temp:  [97.9 °F (36.6 °C)] 97.9 °F (36.6 °C)  HR:  [77-97] 84  BP: (105-215)/(51-81) 106/51  Resp:  [12-18] 16  SpO2:  [97 %-99 %] 97 %  O2 Device: None (Room air)    Physical Exam  Vitals reviewed.   Constitutional:       Appearance: She is underweight.   HENT:      Head: Normocephalic.      Nose: Nose normal.      Mouth/Throat:      Mouth: Mucous membranes are moist.   Eyes:      Extraocular Movements: Extraocular movements intact.   Cardiovascular:      Rate and Rhythm: Normal rate and regular rhythm.   Pulmonary:      Effort: Pulmonary effort is normal. No respiratory distress.      Breath sounds: Normal breath sounds. No wheezing.   Abdominal:      General: Bowel sounds are normal. There is no distension.      Palpations: Abdomen is soft.      Tenderness: There is no abdominal tenderness.   Musculoskeletal:         General: No swelling.      Cervical back: Neck supple.   Skin:     General: Skin is warm and dry.   Neurological:      Comments: Mild right facial droop            Lab Results: I have reviewed the following results:  Results from last 7 days   Lab Units 11/10/24  0903   WBC Thousand/uL 7.63   HEMOGLOBIN g/dL 10.0*   HEMATOCRIT % 30.8*   PLATELETS Thousands/uL 325     Results from last 7 days   Lab Units 11/10/24  0903   SODIUM mmol/L 133*   POTASSIUM mmol/L 4.4   CHLORIDE mmol/L 103   CO2 mmol/L 25   BUN mg/dL 17   CREATININE mg/dL 0.89   ANION GAP mmol/L 5   CALCIUM mg/dL 7.4*   GLUCOSE RANDOM mg/dL 318*     Results from last 7 days   Lab Units 11/10/24  0903   INR  1.08     Results from last 7 days   Lab Units 11/10/24  1216 11/10/24  0939 11/10/24  0833   POC GLUCOSE  mg/dl 123 238* 39*     Lab Results   Component Value Date    HGBA1C 8.7 (H) 08/15/2023    HGBA1C 9.8 (H) 06/20/2023    HGBA1C 8.1 (H) 02/06/2022           Imaging Results Review: I reviewed radiology reports from this admission including: CTA head and neck and CT head.      Administrative Statements   I have spent a total time of 76 minutes in caring for this patient on the day of the visit/encounter including Diagnostic results, Patient and family education, Impressions, Counseling / Coordination of care, Documenting in the medical record, Reviewing / ordering tests, medicine, procedures  , Obtaining or reviewing history  , and Communicating with other healthcare professionals .    ** Please Note: This note has been constructed using a voice recognition system. **

## 2024-11-10 NOTE — ASSESSMENT & PLAN NOTE
Patient presented with right-sided weakness, in the ED patient also endorsed having some ongoing constant chest pressure.  Patient's EKG shows mild inferolateral ST depressions.  Troponins negative x 3.    Patient's chest pain is likely secondary to noncardiac causes.  However, patient does have significant risk factor's with poorly controlled type 2 diabetes,  hyperlipidemia and hypertension.  She also has known CAD of her RCA.     Plan:   Telemetry to monitor for arrhythmias  Will obtain echocardiogram  Obtain pharmacologic nuclear stress test as an outpatient    Continue aspirin and statin  Once stroke workup is completed, recommend adding on beta-blocker as an antianginal

## 2024-11-10 NOTE — CONSULTS
Consultation - Cardiology   Name: Tavia Eldridge 79 y.o. female I MRN: [id number]  Unit/Bed#: ED 21 I Date of Admission: 11/10/2024   Date of Service: 11/10/2024 I Hospital Day: 0   Inpatient consult to Cardiology  Consult performed by: Tony Mojica MD  Consult ordered by: Aubrie Stroud MD        Physician Requesting Evaluation: Aubrie Stroud MD   Reason for Evaluation / Principal Problem: Chest pain    Assessment & Plan  Chest pain  Patient presented with right-sided weakness, in the ED patient also endorsed having some ongoing constant chest pressure.  Patient's EKG shows mild inferolateral ST depressions.  Troponins negative x 3.    Patient's chest pain is likely secondary to noncardiac causes.  However, patient does have significant risk factor's with poorly controlled type 2 diabetes,  hyperlipidemia and hypertension.  She also has known CAD of her RCA.     Plan:   Telemetry to monitor for arrhythmias  Will obtain echocardiogram  Obtain pharmacologic nuclear stress test as an outpatient    Continue aspirin and statin  Once stroke workup is completed, recommend adding on beta-blocker as an antianginal     Coronary artery disease involving native coronary artery of native heart with angina pectoris (HCC)  Patient has known history of CAD was admitted for NSTEMI at [facility] in [facility]22 and ultimately underwent cardiac catheterization showing 50% stenosis of the proximal RCA, this lesion was not intervened on.  She is maintained on atorvastatin 40 mg daily    Continue aspirin 81 daily  Continue atorvastatin 40 mg daily  Will obtain lipid panel  Stroke-like symptoms  Patient is currently being worked up strokelike symptoms  CTA head and neck showed bilateral vertebral artery stenosis  There is also a 50% left subclavian artery stenosis  MRI pending  Neurology following  Type 2 diabetes mellitus, with long-term current use of insulin (HCC)  Patient's most recent A1c:8.7  Current home regimen includes: Insulin 19 U  and Prandin 1 mg TID  Patient's blood glucose at the time of presentation was 39, which could have contributed to patient's presentation    Hyperlipidemia  Lipid panel (June 21, 2023): Cholesterol 275 mg/dL, Triglycerides 194 mg/dL, HDL 44 and LDL of 192  Continue statin  Hypertension, essential  On lisinopril 10 mg QD PTA  Acid reflux    Asthma    Anemia        History of Present Illness   Tavia Eldridge is a 79 y.o. female who presents with history of CAD, type 2 diabetes, hyperlipidemia, hypertension, prior history of NSTEMI (2022), and asthma. She has had episodes of right-sided weakness and numbness, initially occurring about a week ago which resolved within a few minutes and recurred again today. The weakness involved her entire right side, including her arm and leg, and was accompanied by an inability to get out of bed.     The patient also reports intermittent chest pain described as pressure, primarily in the middle of her chest, but not radiating to her jaw or arm. This pressure is 4-5/10. The chest pain occurs both at rest and during exertion and is sometimes related to swallowing.  Patient says occasionally her chest pain radiates down towards her abdomen.  The patient has also experienced a gradual decline in her ability to perform daily activities over the past six months due to increased shortness of breath and fatigue.  She denies any palpitations or episodes of syncope.     Review of Systems   Constitutional:  Positive for activity change and appetite change.   Respiratory:  Positive for shortness of breath.    Cardiovascular:  Positive for chest pain. Negative for palpitations and leg swelling.   Gastrointestinal:  Positive for abdominal pain.         Objective :  Temp:  [97.9 °F (36.6 °C)] 97.9 °F (36.6 °C)  HR:  [77-97] 80  BP: (116-215)/(57-81) 125/58  Resp:  [12-18] 12  SpO2:  [97 %-99 %] 99 %  O2 Device: None (Room air)  Orthostatic Blood Pressures      Flowsheet Row Most Recent Value   Blood  "Pressure 125/58 filed at 11/10/2024 1400   Patient Position - Orthostatic VS Lying filed at 11/10/2024 0900          First Weight: Weight - Scale: 43.6 kg (96 lb 1.9 oz) (11/10/24 0845)  Vitals:    11/10/24 0845   Weight: 43.6 kg (96 lb 1.9 oz)       Physical Exam  Constitutional:       Appearance: Normal appearance. She is not ill-appearing.   Cardiovascular:      Rate and Rhythm: Normal rate and regular rhythm.      Heart sounds: Murmur heard.   Pulmonary:      Effort: Pulmonary effort is normal.      Breath sounds: No rales.   Musculoskeletal:      Right lower leg: No edema.      Left lower leg: No edema.   Skin:     General: Skin is warm and dry.   Neurological:      General: No focal deficit present.      Mental Status: She is alert and oriented to person, place, and time.           Lab Results: I have reviewed the following results:CBC/BMP:   .     11/10/24  0903   WBC 7.63   HGB 10.0*   HCT 30.8*      SODIUM 133*   K 4.4      CO2 25   BUN 17   CREATININE 0.89   GLUC 318*      Results from last 7 days   Lab Units 11/10/24  0903   WBC Thousand/uL 7.63   HEMOGLOBIN g/dL 10.0*   HEMATOCRIT % 30.8*   PLATELETS Thousands/uL 325     Results from last 7 days   Lab Units 11/10/24  0903   POTASSIUM mmol/L 4.4   CHLORIDE mmol/L 103   CO2 mmol/L 25   BUN mg/dL 17   CREATININE mg/dL 0.89   CALCIUM mg/dL 7.4*     Results from last 7 days   Lab Units 11/10/24  0903   INR  1.08   PTT seconds 27     Lab Results   Component Value Date    HGBA1C 8.7 (H) 08/15/2023     No results found for: \"CKTOTAL\", \"CKMB\", \"CKMBINDEX\", \"TROPONINI\"    Imaging Results Review: No pertinent imaging studies reviewed.  Other Study Results Review: EKG was reviewed.     VTE Prophylaxis: VTE covered by:  heparin (porcine), Subcutaneous, 5,000 Units at 11/10/24 1313         "

## 2024-11-10 NOTE — ASSESSMENT & PLAN NOTE
Patient has known history of CAD was admitted for NSTEMI at [facility] in 2022 and ultimately underwent cardiac catheterization showing 50% stenosis of the proximal RCA, this lesion was not intervened on.  She is maintained on atorvastatin 40 mg daily    Continue aspirin 81 daily  Continue atorvastatin 40 mg daily  Will obtain lipid panel

## 2024-11-10 NOTE — ASSESSMENT & PLAN NOTE
Home regimen: Lisinopril 10 mg daily  Hold meds in the setting of possible acute CVA requiring permissive hypertension  Will need BB as above when normotension permitted by neurology

## 2024-11-10 NOTE — ASSESSMENT & PLAN NOTE
Patient's most recent A1c:8.7  Current home regimen includes: Insulin 19 U and Prandin 1 mg TID  Patient's blood glucose at the time of presentation was 39, which could have contributed to patient's presentation

## 2024-11-10 NOTE — ASSESSMENT & PLAN NOTE
BG 39 on arrival  Received Lantus 19 units yesterday evening  Poor oral intake since around 2 months - worsening  Received 25 ml D50 and on D5NS  Latest blood sugar 123  Regular diet  Ct D5NS at lower rate - 40 ml/hr  Monitor blood sugars

## 2024-11-10 NOTE — ASSESSMENT & PLAN NOTE
H/o SDH many years ago - s/p right frontotemporal craniotomy  CT head today - no acute abnormality

## 2024-11-10 NOTE — ASSESSMENT & PLAN NOTE
Patient is currently being worked up strokelike symptoms  CTA head and neck showed bilateral vertebral artery stenosis  There is also a 50% left subclavian artery stenosis  MRI pending  Neurology following

## 2024-11-10 NOTE — ASSESSMENT & PLAN NOTE
Waxing and waning right sided weakness and numbness since 3 days, confusion since last night. Recurrence of right sided weakness and numbness this morning and she was brought to the ED by her family  Blood glucose 39 on arrival  CT head - No acute intracranial hemorrhage, mass effect or edema. Right pterional craniotomy noted.  CTA head and neck - Moderate stenoses of the origins of both vertebral arteries with the vessel is patent distally. Moderate stenosis of the left subclavian artery origin approximately 50%. Carotid arterial system is patent with mild atherosclerotic disease. No intracranial aneurysm or major intracranial arterial stenosis.   Due to hypoglycemia vs CVA  Seen by neurology - received  mg, then 81 mg daily, ct home Atorvastatin 40 mg daily  MRI brain, echo, FLP, HbA1c, neurochecks

## 2024-11-10 NOTE — ASSESSMENT & PLAN NOTE
79 y.o. female with pertinent PMH of type 2 diabetes, hypertension, anxiety, CAD, NSTEMI history. Stroke alert activated on 11/10/24 at 0818 for sxs concerning for stroke consisting of dysarthria, right sided weakness. LKW on 11/9/2024 at 6 PM where her  notes that the patient started having some altered mental status, the right sided weakness and sensory deficits began today however patient notes that she has had waxing and waning weakness over the past 3 days. Initial NIHSS 3 (patient missed both LOC questions and has a mild right facial droop.). Initial BP Blood Pressure: 142/73, FSBG POC Glucose: (!) 39 (RN NOTIFIED/CRIT IZABELLA). NC CTH negative and CTA H/N negative for LVO/high-grade stenosis. As a result of Symptoms resolved/clearly non disabling patient was not determined to be a candidate for thrombolysis (TNK). Given absence of IR target pt was deemed not a candidate for mechanical thrombectomy.   - Home Antiplatelet /Anticoagulants PTA: no antiplatelet or anticoagulants  - Stroke risk factors: HTN, DM, and Coronary Artery Disease  - Prior Stroke Hx: none  - Past Neurological Hx: neurosurgical procedures -right frontotemporal craniotomy from a questionable traumatic bleed    Workup:  - CTH: No acute intracranial pathology, prior right pterional craniotomy  - CTA: Moderate stenoses of the origins of both vertebral arteries, moderate stenosis of the left subclavian artery at 50%  - MRI: Pending  - JAMAL: Pending  - Labs: Hemoglobin A1c 8.7 (8/15/2023), LDL No results in last 5 years (No results in last 5 years)    Impression: 79 y.o. female with history of  type 2 diabetes, hypertension, anxiety, CAD, NSTEMI, SVT, right pterional craniotomy currently on no AP/AC who presented with right-sided weakness and numbness as well as dysarthria.  NIH 3.  Blood glucose was found to be 39.  Patient also had some nausea and vomiting while at CT scanner.  Patient's symptoms are likely metabolic in nature, will rule out  stroke with MRI brain.    Plan: Discussed plan with neurology attending, Dr. Mares  Admit along the stroke pathway with telemetry monitoring  Strict NPO prior to dysphagia screen   Frequent neuro checks per protocol. If there is any acute changes in exam, please obtain stat CT head and notify neurology team  BP Goals: Permissive hypertension - SBP<220/110 for first 24 hrs.   Antiplatelet agents: Load  mg, then 81 mg daily.   Anticoagulation: None  Statin: Continue home statin, Lipitor, at 40 mg daily   Labs: Hgb A1c, LDL   Brain Imaging: Obtain MRI brain without contrast  TTE, pending  Euthermic/Euglycemic  DVT PPx: Per primary, SCDs  PT/OT/ST/PMR evaluations when able  Stroke education and counseling  Rest of other care per primary team appreciated    Disposition: PT Recommendations -

## 2024-11-11 ENCOUNTER — APPOINTMENT (INPATIENT)
Dept: RADIOLOGY | Facility: HOSPITAL | Age: 79
DRG: 637 | End: 2024-11-11
Payer: COMMERCIAL

## 2024-11-11 ENCOUNTER — APPOINTMENT (INPATIENT)
Dept: NON INVASIVE DIAGNOSTICS | Facility: HOSPITAL | Age: 79
DRG: 637 | End: 2024-11-11
Payer: COMMERCIAL

## 2024-11-11 VITALS
DIASTOLIC BLOOD PRESSURE: 61 MMHG | RESPIRATION RATE: 20 BRPM | OXYGEN SATURATION: 97 % | HEART RATE: 84 BPM | TEMPERATURE: 97.9 F | SYSTOLIC BLOOD PRESSURE: 119 MMHG

## 2024-11-11 LAB
ALBUMIN SERPL BCG-MCNC: 3.6 G/DL (ref 3.5–5)
ALP SERPL-CCNC: 50 U/L (ref 34–104)
ALT SERPL W P-5'-P-CCNC: 14 U/L (ref 7–52)
ANION GAP SERPL CALCULATED.3IONS-SCNC: 7 MMOL/L (ref 4–13)
AORTIC ROOT: 2.1 CM
AORTIC VALVE MEAN VELOCITY: 18.1 M/S
APICAL FOUR CHAMBER EJECTION FRACTION: 70 %
AST SERPL W P-5'-P-CCNC: 21 U/L (ref 13–39)
AV AREA BY CONTINUOUS VTI: 1.3 CM2
AV AREA PEAK VELOCITY: 1.2 CM2
AV LVOT MEAN GRADIENT: 6 MMHG
AV LVOT PEAK GRADIENT: 9 MMHG
AV MEAN GRADIENT: 15 MMHG
AV PEAK GRADIENT: 25 MMHG
AV VALVE AREA: 1.33 CM2
AV VELOCITY RATIO: 0.61
BASOPHILS # BLD AUTO: 0.03 THOUSANDS/ÂΜL (ref 0–0.1)
BASOPHILS NFR BLD AUTO: 0 % (ref 0–1)
BILIRUB SERPL-MCNC: 0.31 MG/DL (ref 0.2–1)
BSA FOR ECHO PROCEDURE: 1.42 M2
BUN SERPL-MCNC: 10 MG/DL (ref 5–25)
CALCIUM SERPL-MCNC: 8.5 MG/DL (ref 8.4–10.2)
CHLORIDE SERPL-SCNC: 111 MMOL/L (ref 96–108)
CHOLEST SERPL-MCNC: 138 MG/DL
CO2 SERPL-SCNC: 25 MMOL/L (ref 21–32)
CREAT SERPL-MCNC: 0.95 MG/DL (ref 0.6–1.3)
DOP CALC AO PEAK VEL: 2.52 M/S
DOP CALC AO VTI: 49.15 CM
DOP CALC LVOT AREA: 2.01 CM2
DOP CALC LVOT CARDIAC INDEX: 3.8 L/MIN/M2
DOP CALC LVOT CARDIAC OUTPUT: 5.4 L/MIN
DOP CALC LVOT DIAMETER: 1.6 CM
DOP CALC LVOT PEAK VEL VTI: 32.64 CM
DOP CALC LVOT PEAK VEL: 1.53 M/S
DOP CALC LVOT STROKE INDEX: 48.6 ML/M2
DOP CALC LVOT STROKE VOLUME: 69
DOP CALC MV VTI: 36.77 CM
E WAVE DECELERATION TIME: 214 MS
E/A RATIO: 0.68
EOSINOPHIL # BLD AUTO: 0.06 THOUSAND/ÂΜL (ref 0–0.61)
EOSINOPHIL NFR BLD AUTO: 1 % (ref 0–6)
ERYTHROCYTE [DISTWIDTH] IN BLOOD BY AUTOMATED COUNT: 14.2 % (ref 11.6–15.1)
EST. AVERAGE GLUCOSE BLD GHB EST-MCNC: 123 MG/DL
FERRITIN SERPL-MCNC: 56 NG/ML (ref 11–307)
FOLATE SERPL-MCNC: 12.1 NG/ML
FRACTIONAL SHORTENING: 36 (ref 28–44)
GFR SERPL CREATININE-BSD FRML MDRD: 57 ML/MIN/1.73SQ M
GLUCOSE SERPL-MCNC: 131 MG/DL (ref 65–140)
GLUCOSE SERPL-MCNC: 138 MG/DL (ref 65–140)
GLUCOSE SERPL-MCNC: 145 MG/DL (ref 65–140)
GLUCOSE SERPL-MCNC: 149 MG/DL (ref 65–140)
GLUCOSE SERPL-MCNC: 151 MG/DL (ref 65–140)
GLUCOSE SERPL-MCNC: 153 MG/DL (ref 65–140)
GLUCOSE SERPL-MCNC: 169 MG/DL (ref 65–140)
GLUCOSE SERPL-MCNC: 170 MG/DL (ref 65–140)
GLUCOSE SERPL-MCNC: 171 MG/DL (ref 65–140)
GLUCOSE SERPL-MCNC: 199 MG/DL (ref 65–140)
GLUCOSE SERPL-MCNC: 218 MG/DL (ref 65–140)
GLUCOSE SERPL-MCNC: 225 MG/DL (ref 65–140)
HBA1C MFR BLD: 5.9 %
HCT VFR BLD AUTO: 34.3 % (ref 34.8–46.1)
HDLC SERPL-MCNC: 33 MG/DL
HGB BLD-MCNC: 10.8 G/DL (ref 11.5–15.4)
IMM GRANULOCYTES # BLD AUTO: 0.03 THOUSAND/UL (ref 0–0.2)
IMM GRANULOCYTES NFR BLD AUTO: 0 % (ref 0–2)
INTERVENTRICULAR SEPTUM IN DIASTOLE (PARASTERNAL SHORT AXIS VIEW): 1.2 CM
INTERVENTRICULAR SEPTUM: 1.2 CM (ref 0.6–1.1)
IRON SATN MFR SERPL: 29 % (ref 15–50)
IRON SERPL-MCNC: 66 UG/DL (ref 50–212)
LAAS-AP2: 13.5 CM2
LAAS-AP4: 12.7 CM2
LDLC SERPL CALC-MCNC: 80 MG/DL (ref 0–100)
LEFT ATRIUM SIZE: 3 CM
LEFT ATRIUM VOLUME (MOD BIPLANE): 33 ML
LEFT ATRIUM VOLUME INDEX (MOD BIPLANE): 23.2 ML/M2
LEFT INTERNAL DIMENSION IN SYSTOLE: 1.8 CM (ref 2.1–4)
LEFT VENTRICULAR INTERNAL DIMENSION IN DIASTOLE: 2.8 CM (ref 3.5–6)
LEFT VENTRICULAR POSTERIOR WALL IN END DIASTOLE: 1.2 CM
LEFT VENTRICULAR STROKE VOLUME: 20 ML
LVSV (TEICH): 20 ML
LYMPHOCYTES # BLD AUTO: 1.56 THOUSANDS/ÂΜL (ref 0.6–4.47)
LYMPHOCYTES NFR BLD AUTO: 20 % (ref 14–44)
MAGNESIUM SERPL-MCNC: 2.3 MG/DL (ref 1.9–2.7)
MCH RBC QN AUTO: 28.2 PG (ref 26.8–34.3)
MCHC RBC AUTO-ENTMCNC: 31.5 G/DL (ref 31.4–37.4)
MCV RBC AUTO: 90 FL (ref 82–98)
MONOCYTES # BLD AUTO: 0.74 THOUSAND/ÂΜL (ref 0.17–1.22)
MONOCYTES NFR BLD AUTO: 10 % (ref 4–12)
MV E'TISSUE VEL-SEP: 5 CM/S
MV MEAN GRADIENT: 4 MMHG
MV PEAK A VEL: 1.65 M/S
MV PEAK E VEL: 113 CM/S
MV PEAK GRADIENT: 14 MMHG
MV STENOSIS PRESSURE HALF TIME: 62 MS
MV VALVE AREA BY CONTINUITY EQUATION: 1.78 CM2
MV VALVE AREA P 1/2 METHOD: 3.5
NEUTROPHILS # BLD AUTO: 5.32 THOUSANDS/ÂΜL (ref 1.85–7.62)
NEUTS SEG NFR BLD AUTO: 69 % (ref 43–75)
NRBC BLD AUTO-RTO: 0 /100 WBCS
PHOSPHATE SERPL-MCNC: 3.4 MG/DL (ref 2.3–4.1)
PLATELET # BLD AUTO: 322 THOUSANDS/UL (ref 149–390)
PMV BLD AUTO: 9.5 FL (ref 8.9–12.7)
POTASSIUM SERPL-SCNC: 4 MMOL/L (ref 3.5–5.3)
PROT SERPL-MCNC: 5.8 G/DL (ref 6.4–8.4)
RBC # BLD AUTO: 3.83 MILLION/UL (ref 3.81–5.12)
RIGHT ATRIAL 2D VOLUME: 18 ML
RIGHT ATRIUM AREA SYSTOLE A4C: 9.1 CM2
RIGHT VENTRICLE ID DIMENSION: 3.1 CM
SL CV LEFT ATRIUM LENGTH A2C: 5.1 CM
SL CV LV EF: 60
SL CV PED ECHO LEFT VENTRICLE DIASTOLIC VOLUME (MOD BIPLANE) 2D: 30 ML
SL CV PED ECHO LEFT VENTRICLE SYSTOLIC VOLUME (MOD BIPLANE) 2D: 10 ML
SODIUM SERPL-SCNC: 143 MMOL/L (ref 135–147)
TIBC SERPL-MCNC: 231 UG/DL (ref 250–450)
TR MAX PG: 25 MMHG
TR PEAK VELOCITY: 2.5 M/S
TRICUSPID VALVE PEAK REGURGITATION VELOCITY: 2.52 M/S
TRIGL SERPL-MCNC: 123 MG/DL
UIBC SERPL-MCNC: 165 UG/DL (ref 155–355)
VIT B12 SERPL-MCNC: 312 PG/ML (ref 180–914)
WBC # BLD AUTO: 7.74 THOUSAND/UL (ref 4.31–10.16)

## 2024-11-11 PROCEDURE — 82948 REAGENT STRIP/BLOOD GLUCOSE: CPT

## 2024-11-11 PROCEDURE — 80061 LIPID PANEL: CPT | Performed by: INTERNAL MEDICINE

## 2024-11-11 PROCEDURE — 82728 ASSAY OF FERRITIN: CPT | Performed by: INTERNAL MEDICINE

## 2024-11-11 PROCEDURE — 85025 COMPLETE CBC W/AUTO DIFF WBC: CPT | Performed by: INTERNAL MEDICINE

## 2024-11-11 PROCEDURE — 83550 IRON BINDING TEST: CPT | Performed by: INTERNAL MEDICINE

## 2024-11-11 PROCEDURE — NC001 PR NO CHARGE: Performed by: PSYCHIATRY & NEUROLOGY

## 2024-11-11 PROCEDURE — 84100 ASSAY OF PHOSPHORUS: CPT | Performed by: INTERNAL MEDICINE

## 2024-11-11 PROCEDURE — 97163 PT EVAL HIGH COMPLEX 45 MIN: CPT

## 2024-11-11 PROCEDURE — NC001 PR NO CHARGE: Performed by: INTERNAL MEDICINE

## 2024-11-11 PROCEDURE — 82746 ASSAY OF FOLIC ACID SERUM: CPT | Performed by: INTERNAL MEDICINE

## 2024-11-11 PROCEDURE — 92610 EVALUATE SWALLOWING FUNCTION: CPT

## 2024-11-11 PROCEDURE — 82607 VITAMIN B-12: CPT | Performed by: INTERNAL MEDICINE

## 2024-11-11 PROCEDURE — 70551 MRI BRAIN STEM W/O DYE: CPT

## 2024-11-11 PROCEDURE — 83036 HEMOGLOBIN GLYCOSYLATED A1C: CPT | Performed by: INTERNAL MEDICINE

## 2024-11-11 PROCEDURE — 93306 TTE W/DOPPLER COMPLETE: CPT | Performed by: INTERNAL MEDICINE

## 2024-11-11 PROCEDURE — 83735 ASSAY OF MAGNESIUM: CPT | Performed by: INTERNAL MEDICINE

## 2024-11-11 PROCEDURE — 97167 OT EVAL HIGH COMPLEX 60 MIN: CPT

## 2024-11-11 PROCEDURE — 83540 ASSAY OF IRON: CPT | Performed by: INTERNAL MEDICINE

## 2024-11-11 PROCEDURE — 80053 COMPREHEN METABOLIC PANEL: CPT | Performed by: INTERNAL MEDICINE

## 2024-11-11 PROCEDURE — 93306 TTE W/DOPPLER COMPLETE: CPT

## 2024-11-11 PROCEDURE — 99232 SBSQ HOSP IP/OBS MODERATE 35: CPT | Performed by: NURSE PRACTITIONER

## 2024-11-11 RX ORDER — INSULIN LISPRO 100 [IU]/ML
1-5 INJECTION, SOLUTION INTRAVENOUS; SUBCUTANEOUS
Status: DISCONTINUED | OUTPATIENT
Start: 2024-11-12 | End: 2024-11-16 | Stop reason: HOSPADM

## 2024-11-11 RX ORDER — CYANOCOBALAMIN 1000 UG/ML
1000 INJECTION, SOLUTION INTRAMUSCULAR; SUBCUTANEOUS
Status: DISCONTINUED | OUTPATIENT
Start: 2024-11-11 | End: 2024-11-16 | Stop reason: HOSPADM

## 2024-11-11 RX ORDER — INSULIN LISPRO 100 [IU]/ML
1-5 INJECTION, SOLUTION INTRAVENOUS; SUBCUTANEOUS
Status: DISCONTINUED | OUTPATIENT
Start: 2024-11-11 | End: 2024-11-16 | Stop reason: HOSPADM

## 2024-11-11 RX ORDER — ALPRAZOLAM 0.25 MG/1
0.25 TABLET ORAL ONCE AS NEEDED
Status: COMPLETED | OUTPATIENT
Start: 2024-11-11 | End: 2024-11-11

## 2024-11-11 RX ADMIN — ATORVASTATIN CALCIUM 40 MG: 40 TABLET, FILM COATED ORAL at 17:26

## 2024-11-11 RX ADMIN — HEPARIN SODIUM 5000 UNITS: 5000 INJECTION INTRAVENOUS; SUBCUTANEOUS at 06:30

## 2024-11-11 RX ADMIN — ALPRAZOLAM 0.25 MG: 0.25 TABLET ORAL at 01:58

## 2024-11-11 RX ADMIN — HEPARIN SODIUM 5000 UNITS: 5000 INJECTION INTRAVENOUS; SUBCUTANEOUS at 13:47

## 2024-11-11 RX ADMIN — BUPROPION HYDROCHLORIDE 150 MG: 150 TABLET, EXTENDED RELEASE ORAL at 08:48

## 2024-11-11 RX ADMIN — ASPIRIN 81 MG CHEWABLE TABLET 81 MG: 81 TABLET CHEWABLE at 08:48

## 2024-11-11 RX ADMIN — HEPARIN SODIUM 5000 UNITS: 5000 INJECTION INTRAVENOUS; SUBCUTANEOUS at 22:11

## 2024-11-11 RX ADMIN — OLANZAPINE 15 MG: 10 TABLET, FILM COATED ORAL at 22:11

## 2024-11-11 RX ADMIN — MONTELUKAST 10 MG: 10 TABLET, FILM COATED ORAL at 22:11

## 2024-11-11 RX ADMIN — DEXTROSE AND SODIUM CHLORIDE 75 ML/HR: 5; .9 INJECTION, SOLUTION INTRAVENOUS at 08:49

## 2024-11-11 RX ADMIN — CYANOCOBALAMIN 1000 MCG: 1000 INJECTION, SOLUTION INTRAMUSCULAR at 17:26

## 2024-11-11 NOTE — ASSESSMENT & PLAN NOTE
Patient is currently being worked up strokelike symptoms, symptoms improved after glucose administration for low blood sugar on admission  CTA head and neck showed bilateral vertebral artery stenosis  There is also a 50% left subclavian artery stenosis  MRI pending  Neurology following

## 2024-11-11 NOTE — PROGRESS NOTES
Progress Note - Cardiology   Name: Tavia Eldridge 79 y.o. female I MRN: [id number]  [facility] I Date of Admission: 11/10/2024   Date of Service: 11/11/2024 I Hospital Day: 1     Assessment & Plan  Chest pain  EKG shows mild inferolateral ST depressions.  Troponins negative x 3.  Likely non-cardiac chest pain.  However, patient does have significant risk factor's with poorly controlled type 2 diabetes,  hyperlipidemia and hypertension.  She also has known CAD of her RCA.  [facility] 2/7/20[facility] and [facility]: 50% RCA stenosis, no significant disease of the other vessels.     Plan:   Follow up echocardiogram  Can obtain pharmacologic nuclear stress test as an outpatient if pain recurs  Continue aspirin and statin  Continue on telemetry    Coronary artery disease involving native coronary artery of native heart with angina pectoris (HCC)  Patient has known history of CAD was admitted for NSTEMI at [facility] in 2022 and ultimately underwent cardiac catheterization showing 50% stenosis of the proximal RCA, this lesion was not intervened on.  She is maintained on atorvastatin 40 mg daily    Continue aspirin 81 daily  Continue atorvastatin 40 mg daily  Stroke-like symptoms  Patient is currently being worked up strokelike symptoms, symptoms improved after glucose administration for low blood sugar on admission  CTA head and neck showed bilateral vertebral artery stenosis  There is also a 50% left subclavian artery stenosis  MRI pending  Neurology following  Type 2 diabetes mellitus, with long-term current use of insulin ([facility])  Patient's most recent A1c:8.7  Current home regimen includes: Insulin 19 U and Prandin 1 mg TID  Patient's blood glucose at the time of presentation was 39, which could have contributed to patient's presentation    Hyperlipidemia  Lipid panel (June 21, 2023): Cholesterol 275 mg/dL, Triglycerides 194 mg/dL, HDL 44 and LDL of 192  Continue statin  Primary hypertension  On lisinopril 10 mg QD PTA  History of  "subdural hematoma        Case discussed and reviewed with Dr. Crow who agrees with my assessment and plan.    Thank you for involving us in the care of your patient.      Antoine Palacios DO  Cardiology Fellow   PGY-5        Subjective:   Patient seen and examined.  No significant events overnight. Denies lightheadedness, dizziness, syncope, headache, vision changes, diaphoresis, chest pain, palpitations, shortness of breath, PND, orthopnea, nausea, vomiting, abdominal pain or lower extremity edema.    Hospital Course:   Tavia Eldridge is a 79 y.o. year old female with a history of CAD, type 2 diabetes, hyperlipidemia, hypertension, prior history of NSTEMI (2022), and asthma.  Patient presented with an episode of right-sided weakness and numbness and was admitted with strokelike symptoms.  Cardiology was consulted for intermittent chest pain that she was experiencing.    Vitals: Blood pressure 125/62, pulse 88, temperature 97.7 °F (36.5 °C), temperature source Oral, resp. rate 20, height 5' 3\" (1.6 m), weight 43.5 kg (96 lb), SpO2 97%., Body mass index is 17.01 kg/m².,   Orthostatic Blood Pressures      Flowsheet Row Most Recent Value   Blood Pressure 125/62 filed at 11/11/2024 0407   Patient Position - Orthostatic VS Lying filed at 11/11/2024 0200              Intake/Output Summary (Last 24 hours) at 11/11/2024 0833  Last data filed at 11/10/2024 1315  Gross per 24 hour   Intake 1311.25 ml   Output --   Net 1311.25 ml       Review of System:  Review of system was conducted and was negative except for as stated in the subjective course.    Physical Exam:    GEN: Tavia Eldridge appears well, alert and oriented x 3, pleasant and cooperative   HEENT:  Normocephalic, atraumatic, anicteric, moist mucous membranes  NECK: No JVD or carotid bruits   HEART: regular rhythm, regular rate, normal S1 and S2, no murmurs, clicks, gallops or rubs   LUNGS: Clear to auscultation bilaterally; no wheezes, rales, or rhonchi; " respiration nonlabored   ABDOMEN:  Normoactive bowel sounds, soft, no tenderness, no distention  EXTREMITIES: peripheral pulses palpable; no edema  NEURO: no gross focal findings; cranial nerves grossly intact   SKIN:  Dry, intact, warm to touch      Current Facility-Administered Medications:     acetaminophen (TYLENOL) tablet 650 mg, 650 mg, Oral, Q4H PRN, Aubrie Stroud MD    aspirin chewable tablet 81 mg, 81 mg, Oral, Daily, Aubrie Stroud MD, 81 mg at 11/10/24 1312    atorvastatin (LIPITOR) tablet 40 mg, 40 mg, Oral, QPM, Aubrie Stroud MD, 40 mg at 11/10/24 1726    buPROPion (WELLBUTRIN XL) 24 hr tablet 150 mg, 150 mg, Oral, Daily, Aubrie Stroud MD    dextrose 5 % and sodium chloride 0.9 % infusion, 75 mL/hr, Intravenous, Continuous, Aubrie Stroud MD, Last Rate: 75 mL/hr at 11/10/24 1724, 75 mL/hr at 11/10/24 1724    heparin (porcine) subcutaneous injection 5,000 Units, 5,000 Units, Subcutaneous, Q8H KYRA, Aubrie Stroud MD, 5,000 Units at 11/11/24 0630    montelukast (SINGULAIR) tablet 10 mg, 10 mg, Oral, HS, Aubrie Stroud MD, 10 mg at 11/10/24 2322    OLANZapine (ZyPREXA) tablet 15 mg, 15 mg, Oral, HS, Aubrie Stroud MD, 15 mg at 11/10/24 2322    Labs & Results:  Results from last 7 days   Lab Units 11/10/24  1317 11/10/24  1109 11/10/24  0903   HS TNI 0HR ng/L  --   --  11   HS TNI 2HR ng/L  --  23  --    HS TNI 4HR ng/L 24  --   --          Results from last 7 days   Lab Units 11/11/24  0646 11/10/24  0903   POTASSIUM mmol/L 4.0 4.4   CO2 mmol/L 25 25   CHLORIDE mmol/L 111* 103   BUN mg/dL 10 17   CREATININE mg/dL 0.95 0.89     Results from last 7 days   Lab Units 11/11/24  0646 11/10/24  0903   HEMOGLOBIN g/dL 10.8* 10.0*   HEMATOCRIT % 34.3* 30.8*   PLATELETS Thousands/uL 322 325     Results from last 7 days   Lab Units 11/11/24  0646   TRIGLYCERIDES mg/dL 123   HDL mg/dL 33*   LDL CALC mg/dL 80   HEMOGLOBIN A1C % 5.9*       Telemetry:   Personally reviewed by Antoine Palacios,  DO: normal sinus rhythm      ** Please Note: Fluency DirectDictation voice to text software may have been used in the creation of this document. **

## 2024-11-11 NOTE — OCCUPATIONAL THERAPY NOTE
Occupational Therapy Evaluation     Patient Name: Tavia Eldridge  Today's Date: 11/11/2024  Problem List  Principal Problem:    Stroke-like symptoms  Active Problems:    Type 2 diabetes mellitus, with long-term current use of insulin (HCC)    Hyperlipidemia    Coronary artery disease involving native coronary artery of native heart with angina pectoris (HCC)    History of subdural hematoma    Primary hypertension    Paroxysmal SVT (supraventricular tachycardia) (HCC)    Acid reflux    Asthma    Chest pain    Anemia    Hypoglycemia    Depression    Weight loss/loss of appetite    Past Medical History  Past Medical History:   Diagnosis Date    Asthma     Coronary artery disease     Depression     Diabetes mellitus (HCC)     Hyperlipidemia     Hypertension     Subdural hematoma (HCC)     SVT (supraventricular tachycardia) (HCC)      Past Surgical History  Past Surgical History:   Procedure Laterality Date    CRANIOTOMY             11/11/24 1105   OT Last Visit   OT Visit Date 11/11/24   Note Type   Note type Evaluation   Pain Assessment   Pain Assessment Tool 0-10   Pain Score No Pain   Hospital Pain Intervention(s) Repositioned;Ambulation/increased activity;Emotional support   Restrictions/Precautions   Weight Bearing Precautions Per Order No   Other Precautions Cognitive;Chair Alarm;Bed Alarm;Multiple lines;Fall Risk;Telemetry   Home Living   Type of Home House   Home Layout Multi-level;Performs ADLs on one level;Stairs to enter with rails  (3 Sh, 6 ALESHA with railing, bedroom on 1st fl with bedside commode, bathroom on 2nd fl)   Bathroom Shower/Tub Tub/shower unit   Bathroom Toilet Standard   Bathroom Equipment Commode   Additional Comments Per pt's , the pt has required an increasing amount of assistance with ADLs and IADLs over the past 3 years. The pt and her  live with their son and 2 grandchildren in a 3 SH. The pt's  reports that they stay in the 1st fl bedroom and the pt uses a bedside  commode due to the bathroom being on the 2nd fl.   Prior Function   Level of [place] Needs assistance with IADLS;Needs assistance with ADLs;Independent with functional mobility   Lives With Spouse;Family   Receives Help From Family   IADLs Family/Friend/Other provides transportation;Family/Friend/Other provides meals;Family/Friend/Other provides medication management   Falls in the last 6 months 1 to 4   Vocational Retired   Comments The pt's  reports that the pt mainly stays in bed all day, does not eat meals, and has no interest in leisure activities. The pt and pt's 's daughter who had CP passed away 3 years ago. 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   Lifestyle   Autonomy The pt requires assistance with ADLs, IADLs, and is independent with functional mobility over short distances. As previously mentioned, the pt's  reports the pt's autonomy worsening since their daughter passed away.   Reciprocal Relationships Lives with her  who provides assistance with ADLs and IADLs as needed. 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   Intrinsic Gratification Pt's  reports that the pt does not have any interests in leisure exploration or participation since their daughter passed away.   General   Family/Caregiver Present Yes  (Pt's )   Subjective   Subjective Pt offered no signficant information   ADL   Eating Assistance 4  Minimal Assistance   Grooming Assistance 5  Supervision/Setup   UB Bathing Assistance 4  Minimal Assistance   LB Bathing Assistance 4  Minimal Assistance   UB Dressing Assistance 4  Minimal Assistance   LB Dressing Assistance 4  Minimal Assistance   Toileting Assistance  5  Supervision/Setup   Bed Mobility   Supine to  Sit 4  Minimal assistance   Additional items Increased time required;Verbal cues;LE management   Transfers   Sit to Stand 3  Moderate assistance   Stand to Sit 3  Moderate assistance   Balance   Static Sitting Fair   Dynamic Sitting Fair -   Static Standing Poor +   Dynamic Standing Poor   Ambulatory Poor -   Activity Tolerance   Activity Tolerance Patient limited by fatigue   Medical Staff Made Aware PT present for co-evaluation   Nurse Made Aware Nursing cleared pt for therapy   RUE Assessment   RUE Assessment WFL   LUE Assessment   LUE Assessment WFL   Hand Function   Gross Motor Coordination Functional   Fine Motor Coordination Functional   Cognition   Overall Cognitive Status Impaired   Arousal/Participation Alert;Cooperative   Attention Within functional limits   Orientation Level Oriented X4   Memory Within functional limits   Following Commands Follows one step commands with increased time or repetition   Comments Pt is soft-spoken. Pt verbalizes and maintains minimal eye contact throughout the session. The pt's  reports that she has been declining since their daughter passed away.   Assessment   Limitation Decreased ADL status;Decreased UE strength;Decreased cognition;Decreased endurance;Decreased self-care trans;Decreased high-level ADLs   Prognosis Fair   Assessment Pt is a 79 y.o. female admitted to [facility] on 11/10/24. Pt presented to ED with stroke-like symptoms, including R sided weakness/waxing waning, decreased po intakes and hypoglycemia. Pt w/moderate L subclavian and veterbral 50% stenosis. Pt has a past medical history of Asthma, Coronary artery disease, Depression, Diabetes mellitus (HCC), Hyperlipidemia, Hypertension, Subdural hematoma ([facility]), and SVT (supraventricular tachycardia) ([facility]). Currently, pt lives with her  in a 3 . At baseline, pt required assistance with ADLs and IADLs, and independent with functional mobility. As mentioned, the pt's  reports that the pt has  required increasing assistance with ADL/IADL completion since their daughter with CP passed away. 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 Pt currently presents with impairments including, difficulty performing ADLS, difficulty performing IADLS, health management, environment activity tolerance, endurance, standing balance/tolerance, sitting balance/tolerance, safety, judgement, sequencing, task initiation, task termination, and difficulty with leisure exploration. These impairments, as well as pt's fatigue, risk for falls, and home environment limit pt's ability to safely engage in all baseline areas of occupation, including grooming, bathing, dressing, toileting, functional mobility/transfers, community mobility, social participation, and leisure activities, however has family support at home if necessary. From OT standpoint, recommend Level II resources. OT will continue to follow to address the below stated goals.   Goals   Patient Goals Pt did not state any goals, however pt's  would like the pt to eat more food and be more physically active   Short Term Goal #1 1. Pt will complete UB/LB ADLs with mod I after setup using adaptive device and DME PRN. 2. Pt will complete toileting with mod I 3. Pt will increase activity tolerance to 20 to 25 min for participation in ADLs and leisure activities. 4. Pt will be mod I with bed mobility. 5. Pt will be mod I with functional mob/transfers to and from all surfaces with fair+ to good balance and safety. 6. Will assess DME needs. 7. Assist with safe discharge recommendations. 10. Pt will increase BUE AROM to WFL with 4/5 strength for functional use with self care tasks. 11. Pt will have fair+ unsupported sitting/standing for ADL activities such as grooming, toileting, bathing, and dressing. 12. Pt will have G carryover of energy conservation techniques during completion of ADL tasks. 13. Pt will engage in ongoing functional/formal cognitive  assessments to assist with safe d/c planning and increase safety during functional tasks. 14. Pt will identify 5 leisure activities of interest to complete in order to promote emotional and cognitive well-being.   Plan   Treatment Interventions ADL retraining;Functional transfer training;UE strengthening/ROM;Endurance training;Cognitive reorientation;Patient/family training;Equipment evaluation/education;Compensatory technique education;Energy conservation;Activityengagement   Goal Expiration Date 11/25/24   OT Frequency 2-3x/wk   Discharge Recommendation   Rehab Resource Intensity Level, OT II (Moderate Resource Intensity)   AM-PAC Daily Activity Inpatient   Lower Body Dressing 3   Bathing 3   Toileting 3   Upper Body Dressing 3   Grooming 3   Eating 3   Daily Activity Raw Score 18   Daily Activity Standardized Score (Calc for Raw Score >=11) 38.66   AM-PAC Applied Cognition Inpatient   Following a Speech/Presentation 2   Understanding Ordinary Conversation 3   Taking Medications 3   Remembering Where Things Are Placed or Put Away 2   Remembering List of 4-5 Errands 2   Taking Care of Complicated Tasks 2   Applied Cognition Raw Score 14   Applied Cognition Standardized Score 32.02   End of Consult   Education Provided Yes   Patient Position at End of Consult Bedside chair;Bed/Chair alarm activated;All needs within reach   Nurse Communication Nurse aware of consult     TERRI Mukherjee

## 2024-11-11 NOTE — SPEECH THERAPY NOTE
Speech-Language Pathology Bedside Swallow Evaluation      Patient Name: Tavia Eldridge    Today's Date: 11/11/2024     Problem List  Principal Problem:    Stroke-like symptoms  Active Problems:    Type 2 diabetes mellitus, with long-term current use of insulin (HCC)    Hyperlipidemia    Coronary artery disease involving native coronary artery of native heart with angina pectoris (HCC)    History of subdural hematoma    Primary hypertension    Paroxysmal SVT (supraventricular tachycardia) (HCC)    Acid reflux    Asthma    Chest pain    Anemia    Hypoglycemia    Depression    Weight loss/loss of appetite      Past Medical History  Past Medical History:   Diagnosis Date    Asthma     Coronary artery disease     Depression     Diabetes mellitus (HCC)     Hyperlipidemia     Hypertension     Subdural hematoma (HCC)     SVT (supraventricular tachycardia) (HCC)        Past Surgical History  Past Surgical History:   Procedure Laterality Date    CRANIOTOMY         Summary   Pt presented with functional appearing oral and pharyngeal stage swallowing skills with materials administered today. She is assessed with regular solids and thin liquids. PO intake is minimal as patient reports nausea, but she denies difficulty with chewing or swallowing.     Patient admitted with right side weakness and on stroke pathway. Language and speech skills appear adequate.     Risk/s for Aspiration: low     Recommended Diet: regular diet and thin liquids   Recommended Form of Meds: whole with liquid   Aspiration precautions and swallowing strategies: upright posture and small bites/sips  Other Recommendations: Continue frequent oral care    Current Medical Status  Chief Complaint: Right sided weakness, chest pain   Tavia Eldridge is a 79 y.o. female with a PMH of coronary artery disease, hypertension, type 2 diabetes, asthma, depression, SVT, hyperlipidemia, who presents with strokelike symptoms.   She had waxing and waning right-sided weakness and  numbness since the past 3 days and confusion since 6 PM last night.  Her right-sided weakness recurred this morning along with dysarthria and she was hence brought to the ED by her family.   She also complains of lower retrosternal chest pressure with shortness of breath, relieved with rest since a few months.  She has occasional episodes of chest pressure at rest.    She has noted weight loss of around 7 pounds since a few months and progressively worsening appetite.  She also complains of abdominal bloating.  No fever or chills.  She had some nausea and vomiting while at CT but not previously.  No abdominal pain.  No cough.     Allergies:  No known food allergies  Past medical history:  Please see H&P for details    Special Studies:  MRI is pending     Social/Education/Vocational Hx:  Pt lives with family    Swallow Information   Current Risks for Dysphagia & Aspiration:  r/o CVA  Current Symptoms/Concerns:  none  Current Diet: regular diet and thin liquids   Baseline Diet: regular diet and thin liquids    Baseline Assessment   Behavior/Cognition: alert  Speech/Language Status: able to participate in conversation and able to follow commands  Patient Positioning: upright in bed  Pain Status/Interventions/Response to Interventions: No report of or nonverbal indications of pain.     Swallow Mechanism Exam  Facial: symmetrical  Labial: WFL  Lingual: WFL  Velum: unable to visualize  Mandible: adequate ROM  Dentition: adequate  Vocal quality:clear/adequate   Respiratory Status: on RA      Consistencies Assessed and Performance   Consistencies Administered: thin liquids and hard solids  Materials administered included rice and water     Oral Stage: WFL  Mastication was adequate with the materials administered today.  Bolus formation and transfer were functional with no significant oral residue noted.  No overt s/s reduced oral control.    Pharyngeal Stage: WFL  Swallow Mechanics:  Swallowing initiation appeared prompt.   Laryngeal rise was observed and judged to be within functional limits.  No coughing, throat clearing, change in vocal quality or respiratory status noted today.     Esophageal Concerns: none reported    Summary and Recommendations (see above)    Results Reviewed with: patient, RN, and family     Treatment Recommended: evaluation only. Please re-consult with concerns

## 2024-11-11 NOTE — PHYSICAL THERAPY NOTE
PHYSICAL THERAPY EVALUATION  NAME:  Tavia Eldridge  DATE: 11/11/24    AGE:   79 y.o.  Mrn:   [id number]  ADMIT DX:  Altered mental status [R41.82]  Hypoglycemia [E16.2]  CVA (cerebral vascular accident) (HCC) [I63.9]  Exertional chest pain [R07.9]  Stroke-like symptoms [R29.90]  National Institutes of Health (NIH) Stroke Scale level of consciousness score 0, alert; keenly responsive [Z78.9]    Past Medical History:   Diagnosis Date    Asthma     Coronary artery disease     Depression     Diabetes mellitus (HCC)     Hyperlipidemia     Hypertension     Subdural hematoma (HCC)     SVT (supraventricular tachycardia) (HCC)      Past Surgical History:   Procedure Laterality Date    CRANIOTOMY         Length Of Stay: 1  PHYSICAL THERAPY EVALUATION :    11/11/24 1104   PT Last Visit   PT Visit Date 11/11/24   Note Type   Note type Evaluation   Pain Assessment   Pain Assessment Tool 0-10   Pain Score No Pain   Restrictions/Precautions   Weight Bearing Precautions Per Order No   Other Precautions Cognitive;Chair Alarm;Bed Alarm;Multiple lines;Fall Risk;Telemetry   Home Living   Type of Home House   Home Layout Multi-level;Performs ADLs on one level;Stairs to enter with rails  (6STE + HR)   Bathroom Equipment Commode   Additional Comments (S)  Pt presents w/ flat affect- minimal eye contact and spouse providing most history. Pt and spouse live together w/ their son and his 2 kids in a 3SH w/ 6STE. Pt and spouse stay on FF (no bath located on this level) and pt 1* has been using BSC. Per spouse pt does not get changed every day and mostly stays in bed/ doesn't eat much and is able to get on and off toilet w/ S; ambulate short distances w/o DME; he drives pt to appointments and A w/ ADLs and completes all IADLs. Spouse reports pt w/ gradual decline since their daughter who had CP passed away ~3 years prior- pt was her 1* caregiver for 45 years.   Prior Function   Level of [place] Needs assistance with IADLS;Needs  assistance with ADLs;Independent with functional mobility  (more recently requiring inc assist w/ moility to and from appointments w/ inc fatigue)   Lives With Spouse;Family   Receives Help From Family   IADLs Family/Friend/Other provides transportation;Family/Friend/Other provides meals;Family/Friend/Other provides medication management   Falls in the last 6 months 1 to 4   Vocational Retired   Comments see above   General   Family/Caregiver Present Yes  (supportive spouse)   Cognition   Overall Cognitive Status Impaired   Arousal/Participation Cooperative   Orientation Level Oriented X4   Following Commands Follows one step commands with increased time or repetition   Comments flat affect; minimal eye contact and verbalizations throughout; limited insight into defiicts- sposue reports pt has been depressed and declining since daughter passed away ~3 years ago   RUE Assessment   RUE Assessment WFL   LUE Assessment   LUE Assessment WFL   RLE Assessment   RLE Assessment WFL  (3+/5 - generalized weakness throughout but equal b/ly)   LLE Assessment   LLE Assessment WFL   Vision-Basic Assessment   Current Vision No visual deficits   Coordination   Finger to Nose & Finger to Finger  Intact   Light Touch   RLE Light Touch Grossly intact   LLE Light Touch Grossly intact   Proprioception   RLE Proprioception Grossly intact   LLE Proprioception Grossly Intact   Bed Mobility   Supine to Sit 4  Minimal assistance   Additional items Increased time required;Verbal cues;LE management   Transfers   Sit to Stand 3  Moderate assistance   Stand to Sit 3  Moderate assistance   Ambulation/Elevation   Gait pattern Improper Weight shift;Decreased foot clearance;Narrow JUAN;Shuffling;Inconsistent chata;L Knee Gagan;R Knee Gagan   Gait Assistance 3  Moderate assist   Assistive Device None  (HHA + pelvic support)   Distance 22' w/ distances limited by LE instability/ weakness.   Balance   Static Sitting Fair   Dynamic Sitting Fair -    Static Standing Poor +   Dynamic Standing Poor   Ambulatory Poor -   Endurance Deficit   Endurance Deficit Yes   Activity Tolerance   Activity Tolerance Patient limited by fatigue   Medical Staff Made Aware OT + RN for d/c planning recommendations   Nurse Made Aware yes   Assessment  Pt is 79 y.o. female seen for PT evaluation s/p admit to [facility] from home on 11/10/2024  w/ Stroke-like symptoms- including R sided weakness / waxing waning; decreased po intakes and hypoglycemia; Pt w/ moderate L subclavian and veterbral 50% stenosis. MRI (P) and CTB(-) for acute infarct.  Pt  has a past medical history of Asthma, Coronary artery disease, Depression, Diabetes mellitus (HCC), Hyperlipidemia, Hypertension, Subdural hematoma (HCC), and SVT (supraventricular tachycardia) (HCC).       Personal factors affecting pt at time of IE include: steps to enter environment, , advanced age, past experience, inability to perform IADLs, inability to perform ADLs, inability to ambulate household distances, limited insight into impairments and recent  near fall(s); decreased po intake; limited engagement and probable depression as contributing factor as noted by spouse.  Due to acute medical issues, ongoing medical workup for primary dx; pain, fall risk, increased reliance on more restrictive AD compared to baseline;  decreased activity tolerance compared to baseline, increased assistance needed from caregiver at current time, continuous monitoring, trending labs;  telemetry; cognition; limited engagement ; flat affect; decline in function past years; multiple lines, decline in overall functional mobility status; health management issues; note unstable clinical picture (high complexity).     Pt presents w/ flat affect- minimal eye contact and spouse providing most history. Pt and spouse live together w/ thir son and his 2 kids in a 3SH w/ 6STE. Pt and spouse stay on FF (no bath located on this level) and pt 1* has been  using BSC. Per spouse pt does not get changed every day and mostly stays in bed/ doesn't eat much and is able to get on and off toilet w/ S; ambulate short distances w/o DME; he drives pt to appointments and A w/ ADLs and completes all IADLs. Spouse reports pt w/ gradual decline since their daughter who had CP passed away ~3 years prior- pt was her 1* caregiver for 45 years.     Currently,  pt  is requiring minaAx1 A for bed skills; modAx1 for functional transfers and modAx1 for ambulation w/ HHA + pelvic support 22' w/ distances limited by LE instability/ weakness.    Pt presents functioning below baseline and currently w/ overall mobility deficits 2* to: decreased LE strength/AROM (generalized weakness/ equal B/Ly) ;frail; decreased endurance; decreased activity tolerance; impaired balance; gait deviations; decreased safety awareness; SOB/SCHULZ; fatigue; impaired safety and judgement; limited insight into current deficits; bed/ chair alarms; multiple lines;flat affect; minimal verbalizations; limited eye contact.       Pt currently at risk for falls.  (Please find additional objective findings from PT assessment regarding body systems outlined above.) Pt will continue to benefit from skilled PT interventions to address stated impairments; to maximize functional potential; for ongoing pt/ family training; and DME needs.  PT is recommending PT Resource Intensity Level  2 on d/c + pastoral care consult; consider depression screening w/ MD/ team/ outpt psych or counseling + geriatrics consult     PT will follow for STG as outlined on eval.  Pt/ family agreeable to PT POC and goals as stated.    Prognosis Fair   Problem List Decreased strength;Decreased endurance;Impaired balance;Decreased mobility;Impaired judgement;Decreased safety awareness   Goals   Patient Goals none stated- spouse would like pt to eat more and be more active abd engaged   STG Expiration Date 11/25/24   Short Term Goal #1 In 14 days pt will:   Complete bed mobility skills with S to facilitate safe return to previous living environment and decrease burden on caregivers.  Perform all functional transfers with S  to facilitate safe return to previous living environment.    Ambulate  with least restrictive AD MI > 150'x2' without LOB and stable vitals for safe ambulation in home/ community environment.   Complete stair training up/ down 6 step/s w/  S for safe access to previous living environment and to increase community access.    Improve balance by 1 grade in order to decrease fall risk.    Improve LE strength grades by 1 to increase independence w/ all functional mobility, transfers and gait.  Actively participate w/ PT for ongoing pt and family education; DME needs and D/C planning to promote highest level of function in least restrictive environment.   PT Treatment Day 0   Plan   Treatment/Interventions LE strengthening/ROM;Functional transfer training;ADL retraining;Therapeutic exercise;Endurance training;Cognitive reorientation;Patient/family training;Equipment eval/education;Bed mobility;Gait training;Compensatory technique education;Spoke to nursing;Spoke to case management;OT;Family   PT Frequency 2-3x/wk   Discharge Recommendation   Rehab Resource Intensity Level, PT II (Moderate Resource Intensity)   AM-PAC Basic Mobility Inpatient   Turning in Flat Bed Without Bedrails 3   Lying on Back to Sitting on Edge of Flat Bed Without Bedrails 3   Moving Bed to Chair 2   Standing Up From Chair Using Arms 2   Walk in Room 2   Climb 3-5 Stairs With Railing 1   Basic Mobility Inpatient Raw Score 13   Basic Mobility Standardized Score 33.99   [facility] Highest Level Of Mobility   -[facility] Goal 4: Move to chair/commode   -HLM Achieved 6: Walk 10 steps or more   End of Consult   Patient Position at End of Consult Bedside chair;Bed/Chair alarm activated;All needs within reach     The patient's AM-PAC Basic Mobility Inpatient Short Form Raw Score is 13. A Raw  score of less than or equal to 16 suggests the patient may benefit from discharge to post-acute rehabilitation services. Please also refer to the recommendation of the Physical Therapist for safe discharge planning.

## 2024-11-11 NOTE — ASSESSMENT & PLAN NOTE
Patient has known history of CAD was admitted for NSTEMI at [facility] in 2022 and ultimately underwent cardiac catheterization showing 50% stenosis of the proximal RCA, this lesion was not intervened on.  She is maintained on atorvastatin 40 mg daily    Continue aspirin 81 daily  Continue atorvastatin 40 mg daily

## 2024-11-11 NOTE — ASSESSMENT & PLAN NOTE
Lab Results   Component Value Date    HGBA1C 5.9 (H) 11/11/2024       Recent Labs     11/11/24  0752 11/11/24  1011 11/11/24  1209 11/11/24  1513   POCGLU 153* 225* 145* 199*       Blood Sugar Average: Last 72 hrs:  (P) 159.4

## 2024-11-11 NOTE — ASSESSMENT & PLAN NOTE
EKG shows mild inferolateral ST depressions.  Troponins negative x 3.  Likely non-cardiac chest pain.  However, patient does have significant risk factor's with poorly controlled type 2 diabetes,  hyperlipidemia and hypertension.  She also has known CAD of her RCA.  [facility] 2/7/2022 and [facility]: 50% RCA stenosis, no significant disease of the other vessels.     Plan:   Follow up echocardiogram  Can obtain pharmacologic nuclear stress test as an outpatient if pain recurs  Continue aspirin and statin  Continue on telemetry

## 2024-11-11 NOTE — ASSESSMENT & PLAN NOTE
79 y.o. female with pertinent PMH of type 2 diabetes, hypertension, anxiety, CAD, NSTEMI history. Stroke alert activated on 11/10/24 at 0818 for sxs concerning for stroke consisting of dysarthria, right sided weakness. LKW on 11/9/2024 at 6 PM where her  notes that the patient started having some altered mental status, the right sided weakness and sensory deficits began today however patient notes that she has had waxing and waning weakness over the past 3 days. Initial NIHSS 3 (patient missed both LOC questions and has a mild right facial droop.). Initial BP Blood Pressure: 142/73, FSBG POC Glucose: (!) 39 (RN NOTIFIED/CRIT IZABELLA). NC CTH negative and CTA H/N negative for LVO/high-grade stenosis. As a result of Symptoms resolved/clearly non disabling patient was not determined to be a candidate for thrombolysis (TNK). Given absence of IR target pt was deemed not a candidate for mechanical thrombectomy.   - Home Antiplatelet /Anticoagulants PTA: no antiplatelet or anticoagulants  - Stroke risk factors: HTN, DM, and Coronary Artery Disease  - Prior Stroke Hx: none  - Past Neurological Hx: neurosurgical procedures -right frontotemporal craniotomy from a questionable traumatic bleed    Workup:  - CTH: No acute intracranial pathology, prior right pterional craniotomy  - CTA: Moderate stenoses of the origins of both vertebral arteries, moderate stenosis of the left subclavian artery at 50%  - MRI: No acute infarction  - JAMAL: Pending  - Labs: Hemoglobin A1c 5.9 (11/11/2024), LDL 80 (11/11/2024)  - B12 borderline low 312    Impression: 79 y.o. female with history of  type 2 diabetes, hypertension, anxiety, CAD, NSTEMI, SVT, right pterional craniotomy currently on no AP/AC who presented with right-sided weakness and numbness as well as dysarthria.  NIH 3.  Blood glucose was found to be 39.  Patient also had some nausea and vomiting while at CT scanner.  Patient's symptoms are likely metabolic in nature, no signs of  ischemic stroke on MRI    Plan: Discussed plan with neurology attending, Dr. Mares  Admit along the stroke pathway with telemetry monitoring  Strict NPO prior to dysphagia screen   Frequent neuro checks per protocol. If there is any acute changes in exam, please obtain stat CT head and notify neurology team  BP Goals: Permissive hypertension - SBP<220/110 for first 24 hrs.   Antiplatelet agents: Load  mg, then 81 mg daily.   Added B12 supplementation IM injection 1000 mcg monthly  Anticoagulation: None  Statin: Continue home statin, Lipitor, at 40 mg daily   TTE, pending  Euthermic/Euglycemic  DVT PPx: Per primary, SCDs  PT/OT/ST/PMR evaluations when able  Stroke education and counseling  Rest of other care per primary team appreciated    Disposition: PT Recommendations -

## 2024-11-11 NOTE — PROGRESS NOTES
Progress Note - Neurology   Name: Tavia Eldridge 79 y.o. female I MRN: [id number]  [facility] Date of Admission: 11/10/2024   Date of Service: 11/11/2024 I Hospital Day: 1    Assessment & Plan  Stroke-like symptoms  79 y.o. female with pertinent PMH of type 2 diabetes, hypertension, anxiety, CAD, NSTEMI history. Stroke alert activated on 11/10/24 at 0818 for sxs concerning for stroke consisting of dysarthria, right sided weakness. LKW on 11/9/2024 at 6 PM where her  notes that the patient started having some altered mental status, the right sided weakness and sensory deficits began today however patient notes that she has had waxing and waning weakness over the past 3 days. Initial NIHSS 3 (patient missed both LOC questions and has a mild right facial droop.). Initial BP Blood Pressure: 142/73, FSBG POC Glucose: (!) 39 (RN NOTIFIED/CRIT IZABELLA). NC CTH negative and CTA H/N negative for LVO/high-grade stenosis. As a result of Symptoms resolved/clearly non disabling patient was not determined to be a candidate for thrombolysis (TNK). Given absence of IR target pt was deemed not a candidate for mechanical thrombectomy.   - Home Antiplatelet /Anticoagulants PTA: no antiplatelet or anticoagulants  - Stroke risk factors: HTN, DM, and Coronary Artery Disease  - Prior Stroke Hx: none  - Past Neurological Hx: neurosurgical procedures -right frontotemporal craniotomy from a questionable traumatic bleed    Workup:  - CTH: No acute intracranial pathology, prior right pterional craniotomy  - CTA: Moderate stenoses of the origins of both vertebral arteries, moderate stenosis of the left subclavian artery at 50%  - MRI: No acute infarction  - JAMAL: Pending  - Labs: Hemoglobin A1c 5.9 (11/11/2024), LDL 80 (11/11/2024)  - B12 borderline low 312    Impression: 79 y.o. female with history of  type 2 diabetes, hypertension, anxiety, CAD, NSTEMI, SVT, right pterional craniotomy currently on  no AP/AC  who presented with  right-sided weakness and numbness as well as dysarthria.  NIH 3.  Blood glucose was found to be 39.  Patient also had some nausea and vomiting while at CT scanner.  Patient's symptoms are likely metabolic in nature, no signs of ischemic stroke on MRI    Plan: Discussed plan with neurology attending, Dr. Mares  Admit along the stroke pathway with telemetry monitoring  Strict NPO prior to dysphagia screen   Frequent neuro checks per protocol. If there is any acute changes in exam, please obtain stat CT head and notify neurology team  BP Goals: Permissive hypertension - SBP<220/110 for first 24 hrs.   Antiplatelet agents: Load  mg, then 81 mg daily.   Added B12 supplementation IM injection 1000 mcg monthly  Anticoagulation: None  Statin: Continue home statin, Lipitor, at 40 mg daily   TTE, pending  Euthermic/Euglycemic  DVT PPx: Per primary, SCDs  PT/OT/ST/PMR evaluations when able  Stroke education and counseling  Rest of other care per primary team appreciated    Disposition: PT Recommendations -      Type 2 diabetes mellitus, with long-term current use of insulin ([facility])  Lab Results   Component Value Date    HGBA1C 5.9 (H) 11/11/2024       Recent Labs     11/11/24  0752 11/11/24  1011 11/11/24  1209 11/11/24  1513   POCGLU 153* 225* 145* 199*       Blood Sugar Average: Last 72 hrs:  (P) 159.4    Hyperlipidemia    Coronary artery disease involving native coronary artery of native heart with angina pectoris (HCC)    History of subdural hematoma    Primary hypertension    Paroxysmal SVT (supraventricular tachycardia) ([facility])    Acid reflux    Asthma    Chest pain    Anemia    Hypoglycemia    Depression    Weight loss/loss of appetite      Tavia Eldridge will not need outpatient follow up with Neurology. She will not require outpatient neurological testing.  I have discussed with Dr. Arreguin the above plan to treat patient.  He agrees with the plan.    Subjective   Patient was seen and evaluated, denies any  further weakness or numbness of her right side.  Her  is in the room and states that he no longer notices any right facial droop.  Patient does not have any complaints at this time.    Review of Systems  negative    Objective :  Temp:  [97.7 °F (36.5 °C)-98 °F (36.7 °C)] 98 °F (36.7 °C)  HR:  [] 79  BP: (103-135)/(51-65) 103/52  Resp:  [14-20] 18  SpO2:  [95 %-100 %] 96 %  O2 Device: None (Room air)    Physical Exam  Constitutional:       General: She is not in acute distress.     Appearance: Normal appearance. She is not ill-appearing.   HENT:      Head: Normocephalic.      Right Ear: External ear normal.      Left Ear: External ear normal.      Mouth/Throat:      Mouth: Mucous membranes are moist.   Eyes:      General: Lids are normal.      Extraocular Movements: Extraocular movements intact.      Conjunctiva/sclera: Conjunctivae normal.      Pupils: Pupils are equal, round, and reactive to light.   Neurological:      Coordination: Coordination is intact.      Deep Tendon Reflexes:      Reflex Scores:       Tricep reflexes are 2+ on the right side and 2+ on the left side.       Bicep reflexes are 2+ on the right side and 2+ on the left side.       Brachioradialis reflexes are 2+ on the right side and 2+ on the left side.       Patellar reflexes are 3+ on the right side and 3+ on the left side.       Achilles reflexes are 2+ on the right side and 2+ on the left side.    Neurological Exam  Mental Status   Oriented only to person and place. Oriented to month but not year.    Cranial Nerves  CN II: Visual fields full to confrontation.  CN III, IV, VI: Extraocular movements intact bilaterally. Normal lids and orbits bilaterally. Pupils equal round and reactive to light bilaterally.  CN V: Facial sensation is normal.  CN VII:  Right: Mild right nasolabial fold flattening.  CN VIII: Hearing is normal.  CN IX, X: Palate elevates symmetrically  CN XI: Shoulder shrug strength is normal.  CN XII: Tongue midline  without atrophy or fasciculations.    Motor  Decreased muscle bulk throughout. Strength is 5/5 in all four extremities except as noted.  Patient is some generalized weakness in the upper extremities bilaterally, 4/5..    Sensory  Light touch is normal in upper and lower extremities.   Patient stated decrease sensation to pinprick on the upper right extremity however also states decreased sensation to her back to the left lower extremity..    Reflexes                                            Right                      Left  Brachioradialis                    2+                         2+  Biceps                                 2+                         2+  Triceps                                2+                         2+  Patellar                                3+                         3+  Achilles                                2+                         2+    Coordination    Finger-to-nose, rapid alternating movements and heel-to-shin normal bilaterally without dysmetria.  Mildly tremulous with the upper extremities bilaterally..        Lab Results: I have reviewed the following results:  Imaging Results Review: I personally reviewed the following image studies in PACS and associated radiology reports: MRI brain. My interpretation of the radiology images/reports is: No acute infarction..  Other Study Results Review: No additional pertinent studies reviewed.    VTE Pharmacologic Prophylaxis: VTE covered by:  heparin (porcine), Subcutaneous, 5,000 Units at 11/11/24 8867

## 2024-11-11 NOTE — CASE MANAGEMENT
Case Management Assessment & Discharge Planning Note    Patient name Tavia Eldridge  Location [facility] 612/[facility] 612-01 MRN [id number]  : 1945 Date 2024       Current Admission Date: 11/10/2024  Current Admission Diagnosis:Stroke-like symptoms   Patient Active Problem List    Diagnosis Date Noted Date Diagnosed    Stroke-like symptoms 11/10/2024     Type 2 diabetes mellitus, with long-term current use of insulin (HCC) 11/10/2024     Hyperlipidemia 11/10/2024     Chest pain 11/10/2024     Anemia 11/10/2024     Hypoglycemia 11/10/2024     Depression 11/10/2024     Weight loss/loss of appetite 11/10/2024     History of subdural hematoma 2023     Paroxysmal SVT (supraventricular tachycardia) ([facility]) 2023     Acid reflux 2023     Asthma 2023     Coronary artery disease involving native coronary artery of native heart with angina pectoris ([facility]) 2022     Primary hypertension 2022       LOS (days): 1  Geometric Mean LOS (GMLOS) (days):   Days to GMLOS:     OBJECTIVE:    Risk of Unplanned Readmission Score: 10.23         Current admission status: Inpatient       [facility] Pharmacy[facility]   [facility] [address] [place], [place] [address]  Phone: [phone] Fax: [phone]    Primary Care Provider: Terrie Aguero MD    Primary Insurance: [employer] REP  Secondary Insurance:     ASSESSMENT:  Active Health Care Proxies    There are no active Health Care Proxies on file.       Readmission Root Cause  30 Day Readmission: No    Patient Information  Admitted from:: Home  Mental Status: Alert  During Assessment patient was accompanied by: Spouse  Assessment information provided by:: Spouse  Primary Caregiver: Self  Support Systems: Self, Spouse/significant other, Son  County of Residence: [place]  What city do you live in?: [place]  Home entry access options. Select all that apply.: Stairs  Number of steps to enter  home.: 6  Do the steps have railings?: No  Type of Current Residence: 3 story home  Upon entering residence, is there a bedroom on the main floor (no further steps)?: No  A bedroom is located on the following floor levels of residence (select all that apply):: 2nd Floor  Upon entering residence, is there a bathroom on the main floor (no further steps)?: No  Indicate which floors of current residence have a bathroom (select all the apply):: 2nd Floor  Number of steps to 2nd floor from main floor: One Flight  Living Arrangements: Lives w/ Spouse/significant other, Lives w/ Son  Is patient a ?: No    Activities of Daily Living Prior to Admission  Functional Status: Assistance  Completes ADLs independently?: No  Level of ADL dependence: Assistance  Ambulates independently?: Yes  Does patient use assisted devices?: No  Does patient currently own DME?: No  Does patient have a history of Outpatient Therapy (PT/OT)?: No  Does the patient have a history of Short-Term Rehab?: No  Does patient have a history of HHC?: Yes  Does patient currently have HHC?: No    Patient Information Continued  Income Source: Pension/[employer]  Does patient have prescription coverage?: Yes  Does patient receive dialysis treatments?: No  Does patient have a history of substance abuse?: No  Does patient have a history of Mental Health Diagnosis?: Yes (Depression)  Is patient receiving treatment for mental health?: Yes  Has patient received inpatient treatment related to mental health in the last 2 years?: No    Means of Transportation  Means of Transport to [facility]:: Family transport  Social Determinants of Health (SDOH)      Flowsheet Row Most Recent Value   Housing Stability    In the last 12 months, was there a time when you were not able to pay the mortgage or rent on time? N   At any time in the past 12 months, were you homeless or living in a shelter (including now)? N   Transportation Needs    In the past 12 months, has lack of  transportation kept you from medical appointments or from getting medications? no   In the past 12 months, has lack of transportation kept you from meetings, work, or from getting things needed for daily living? No   Food Insecurity    Within the past 12 months, you worried that your food would run out before you got the money to buy more. Never true   Within the past 12 months, the food you bought just didn't last and you didn't have money to get more. Never true   Utilities    In the past 12 months has the electric, gas, oil, or water company threatened to shut off services in your home? No        DISCHARGE DETAILS:    Discharge planning discussed with:: Spouse at bedside  Freedom of Choice: Yes  Comments - Freedom of Choice: Discussed FOC  CM contacted family/caregiver?: No- see comments (MARLENE THOMAS)  CM reviewed d/c planning process including the following: identifying help at home, patient preference for d/c planning needs, Discharge Lounge, [facility] Meds to Bed program, availability of treatment team to discuss questions or concerns patient and/or family may have regarding understanding medications and recognizing signs and symptoms once discharged.  CM also encouraged patient to follow up with all recommended appointments after discharge. Patient advised of importance for patient and family to participate in managing patient’s medical well being.     This CM introduced self to spouse at bedside. Pt lives in a 3 story home with family, 6 molly, Bed and bath second level, one flight to reach. Prior to admission spouse reports pt functional status as assistance with ADLs/IADLs. Pt owns no DME. Pt is retired, supported by family, and family transport.

## 2024-11-11 NOTE — PLAN OF CARE
Problem: Prexisting or High Potential for Compromised Skin Integrity  Goal: Skin integrity is maintained or improved  Description: INTERVENTIONS:  - Identify patients at risk for skin breakdown  - Assess and monitor skin integrity  - Assess and monitor nutrition and hydration status  - Monitor labs   - Assess for incontinence   - Turn and reposition patient  - Assist with mobility/ambulation  - Relieve pressure over bony prominences  - Avoid friction and shearing  - Provide appropriate hygiene as needed including keeping skin clean and dry  - Evaluate need for skin moisturizer/barrier cream  - Collaborate with interdisciplinary team   - Patient/family teaching  - Consider wound care consult   Outcome: Progressing     Problem: PAIN - ADULT  Goal: Verbalizes/displays adequate comfort level or baseline comfort level  Description: Interventions:  - Encourage patient to monitor pain and request assistance  - Assess pain using appropriate pain scale  - Administer analgesics based on type and severity of pain and evaluate response  - Implement non-pharmacological measures as appropriate and evaluate response  - Consider cultural and social influences on pain and pain management  - Notify physician/advanced practitioner if interventions unsuccessful or patient reports new pain  Outcome: Progressing     Problem: INFECTION - ADULT  Goal: Absence or prevention of progression during hospitalization  Description: INTERVENTIONS:  - Assess and monitor for signs and symptoms of infection  - Monitor lab/diagnostic results  - Monitor all insertion sites, i.e. indwelling lines, tubes, and drains  - Monitor endotracheal if appropriate and nasal secretions for changes in amount and color  - [place] appropriate cooling/warming therapies per order  - Administer medications as ordered  - Instruct and encourage patient and family to use good hand hygiene technique  - Identify and instruct in appropriate isolation precautions for  identified infection/condition  Outcome: Progressing     Problem: SAFETY ADULT  Goal: Patient will remain free of falls  Description: INTERVENTIONS:  - Educate patient/family on patient safety including physical limitations  - Instruct patient to call for assistance with activity   - Consult OT/PT to assist with strengthening/mobility   - Keep Call bell within reach  - Keep bed low and locked with side rails adjusted as appropriate  - Keep care items and personal belongings within reach  - Initiate and maintain comfort rounds  - Make Fall Risk Sign visible to staff  - Offer Toileting every 2 Hours, in advance of need  - Initiate/Maintain bed alarm  - Obtain necessary fall risk management equipment  - Apply yellow socks and bracelet for high fall risk patients  - Consider moving patient to room near nurses station  Outcome: Progressing  Goal: Maintain or return to baseline ADL function  Description: INTERVENTIONS:  -  Assess patient's ability to carry out ADLs; assess patient's baseline for ADL function and identify physical deficits which impact ability to perform ADLs (bathing, care of mouth/teeth, toileting, grooming, dressing, etc.)  - Assess/evaluate cause of self-care deficits   - Assess range of motion  - Assess patient's mobility; develop plan if impaired  - Assess patient's need for assistive devices and provide as appropriate  - Encourage maximum independence but intervene and supervise when necessary  - Involve family in performance of ADLs  - Assess for home care needs following discharge   - Consider OT consult to assist with ADL evaluation and planning for discharge  - Provide patient education as appropriate  Outcome: Progressing  Goal: Maintains/Returns to pre admission functional level  Description: INTERVENTIONS:  - Perform AM-PAC 6 Click Basic Mobility/ Daily Activity assessment daily.  - Set and communicate daily mobility goal to care team and patient/family/caregiver.   - Collaborate with  rehabilitation services on mobility goals if consulted  - Perform Range of Motion 3 times a day.  - Reposition patient every 2 hours.  - Dangle patient 3 times a day  - Stand patient 3 times a day  - Ambulate patient 3 times a day  - Out of bed to chair 3 times a day   - Out of bed for meals 3 times a day  - Out of bed for toileting  - Record patient progress and toleration of activity level   Outcome: Progressing     Problem: DISCHARGE PLANNING  Goal: Discharge to home or other facility with appropriate resources  Description: INTERVENTIONS:  - Identify barriers to discharge w/patient and caregiver  - Arrange for needed discharge resources and transportation as appropriate  - Identify discharge learning needs (meds, wound care, etc.)  - Arrange for interpretive services to assist at discharge as needed  - Refer to Case Management Department for coordinating discharge planning if the patient needs post-hospital services based on physician/advanced practitioner order or complex needs related to functional status, cognitive ability, or social support system  Outcome: Progressing     Problem: Knowledge Deficit  Goal: Patient/family/caregiver demonstrates understanding of disease process, treatment plan, medications, and discharge instructions  Description: Complete learning assessment and assess knowledge base.  Interventions:  - Provide teaching at level of understanding  - Provide teaching via preferred learning methods  Outcome: Progressing     Problem: NEUROSENSORY - ADULT  Goal: Achieves stable or improved neurological status  Description: INTERVENTIONS  - Monitor and report changes in neurological status  - Monitor vital signs such as temperature, blood pressure, glucose, and any other labs ordered   - Initiate measures to prevent increased intracranial pressure  - Monitor for seizure activity and implement precautions if appropriate      Outcome: Progressing  Goal: Achieves maximal functionality and self  care  Description: INTERVENTIONS  - Monitor swallowing and airway patency with patient fatigue and changes in neurological status  - Encourage and assist patient to increase activity and self care.   - Encourage visually impaired, hearing impaired and aphasic patients to use assistive/communication devices  Outcome: Progressing     Problem: GASTROINTESTINAL - ADULT  Goal: Maintains adequate nutritional intake  Description: INTERVENTIONS:  - Monitor percentage of each meal consumed  - Identify factors contributing to decreased intake, treat as appropriate  - Assist with meals as needed  - Monitor I&O, weight, and lab values if indicated  - Obtain nutrition services referral as needed  Outcome: Progressing     Problem: METABOLIC, FLUID AND ELECTROLYTES - ADULT  Goal: Electrolytes maintained within normal limits  Description: INTERVENTIONS:  - Monitor labs and assess patient for signs and symptoms of electrolyte imbalances  - Administer electrolyte replacement as ordered  - Monitor response to electrolyte replacements, including repeat lab results as appropriate  - Instruct patient on fluid and nutrition as appropriate  Outcome: Progressing  Goal: Fluid balance maintained  Description: INTERVENTIONS:  - Monitor labs   - Monitor I/O and WT  - Instruct patient on fluid and nutrition as appropriate  - Assess for signs & symptoms of volume excess or deficit  Outcome: Progressing  Goal: Glucose maintained within target range  Description: INTERVENTIONS:  - Monitor Blood Glucose as ordered  - Assess for signs and symptoms of hyperglycemia and hypoglycemia  - Administer ordered medications to maintain glucose within target range  - Assess nutritional intake and initiate nutrition service referral as needed  Outcome: Progressing

## 2024-11-11 NOTE — UTILIZATION REVIEW
Initial Clinical Review    Admission: Date/Time/Statement:   Admission Orders (From admission, onward)       Ordered        11/10/24 0925  INPATIENT ADMISSION  Once                          Orders Placed This Encounter   Procedures    INPATIENT ADMISSION     Standing Status:   Standing     Number of Occurrences:   1     Order Specific Question:   Level of Care     Answer:   Med Surg [16]     Order Specific Question:   Estimated length of stay     Answer:   More than 2 Midnights     Order Specific Question:   Certification     Answer:   I certify that inpatient services are medically necessary for this patient for a duration of greater than two midnights. See H&P and MD Progress Notes for additional information about the patient's course of treatment.     ED Arrival Information       Expected   -    Arrival   11/10/2024 08:18    Acuity   Emergent              Means of arrival   Wheelchair    Escorted by   Family Member    Service   Hospitalist    Admission type   Emergency              Arrival complaint   stroke alert             Chief Complaint   Patient presents with    STROKE Alert     Right side weakness. LKW 6pm yesterday       Initial Presentation: 79 y.o. female presents to ed from home on 11-10-24 for evaluation and treatment of  right sided weakness -LKW 6 pm today.  Found with hypoglycemia.   Clinical assessment significant for RUE and RLE weakness.  Difficult to ascertain sensory deficits. Glucose 39.   Imaging shows moderate L subclavian artery origin 50%, moderate stenosis of both vertebral arteries.  Initially treated with iv D 50%, iv zofran x1, iv .9% ns bolus, iv D5% and ns 0.9% 75/hr, heparin sq, aspirin. Admit to inpatient med surg for stroke like symptoms.      Cardiology consult:  telemetry for arrhythmias, echo, stress test as op, continue aspirin and statin, recommend beta blocker as an antianginal.   Neurology consult:  NHISS =3 LOC questions 2, facial palsy 1.   Outside window for TNK.   Plan includes : MRI , telemetry, Echo, PT/OT/ST evaluations.     Anticipated Length of Stay/Certification Statement: Patient will be admitted on an inpatient basis with an anticipated length of stay of greater than 2 midnights secondary to stroke like symptoms, hypoglycemia, weight loss, chest pain with exertion.     Date: 11-11-24    Day 2: inpatient med surg  Facial droop resolved per .   Continue iv fluids 75/hr.  MRI brain, Echo results pending.  Continue neuro checks.  Hypoglycemia per  is difficult tto manage due to patient's poor nutrition.  Latest bld sugar is 123. Decrease rate of D5 ns to 40/hr, monitor glucose.  PT/OT evaluations find functional deficits that may benefit from short term rehab.     Date: 11-12-24   Day 3: Has surpassed a 2nd midnight with active treatments and services.  Cardiology reviewed Echo which shows LVEF 60% and grade 1 diastolic dysfunction.  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  Pain is likely non cardiac.  Focus on musculoskeletal pain. MRI without stroke finding. Short term rehab referrals completed.          ED Treatment-Medication Administration from 11/10/2024 0818 to 11/11/2024 0355         Date/Time Order Dose Route Action     11/10/2024 0840 dextrose 50 % IV solution 25 mL 50 mL Intravenous Given     11/10/2024 0851 ondansetron (ZOFRAN) injection 4 mg 4 mg Intravenous Given     11/10/2024 0840 sodium chloride 0.9 % bolus 1,000 mL 1,000 mL Intravenous New Bag     11/10/2024 0906 dextrose 5 % and sodium chloride 0.9 % infusion 75 mL/hr Intravenous New Bag     11/10/2024 1313 dextrose 5 % and sodium chloride 0.9 % infusion 40 mL/hr Intravenous New Bag     11/10/2024 1724 dextrose 5 % and sodium chloride 0.9 % infusion 75 mL/hr Intravenous Rate/Dose Change     11/10/2024 1726 atorvastatin (LIPITOR) tablet 40 mg 40 mg Oral Given     11/10/2024 1312 aspirin chewable tablet 81 mg 81 mg Oral Given     11/10/2024 1313 heparin (porcine) subcutaneous injection 5,000 Units  5,000 Units Subcutaneous Given     11/10/2024 2325 heparin (porcine) subcutaneous injection 5,000 Units 5,000 Units Subcutaneous Given     11/10/2024 2322 montelukast (SINGULAIR) tablet 10 mg 10 mg Oral Given     11/10/2024 2322 OLANZapine (ZyPREXA) tablet 15 mg 15 mg Oral Given     11/10/2024 1439 aspirin (ECOTRIN LOW STRENGTH) EC tablet 243 mg 243 mg Oral Given     11/10/2024 1719 dextrose 50 % IV solution 25 mL 25 mL Intravenous Given     2024 0158 ALPRAZolam (XANAX) tablet 0.25 mg 0.25 mg Oral Given        NIHSS:  1a.Level of Consciousness: 0 = Alert   1b. LOC Questions: 2 = Answers neither correctly   1c. LOC Commands: 0 = Obeys both correctly   2. Best Gaze: 0 = Normal   3. Visual: 0 = No visual field loss   4. Facial Palsy: 1=Minor paralysis (flattened nasolabial fold, asymmetric on smiling)   5a. Motor Right Arm: 0=No drift, limb holds 90 (or 45) degrees for full 10 seconds   5b. Motor Left Arm: 0=No drift, limb holds 90 (or 45) degrees for full 10 seconds   6a. Motor Right Le=No drift, limb holds 90 (or 45) degrees for full 10 seconds   6b. Motor Left Le=No drift, limb holds 90 (or 45) degrees for full 10 seconds   7. Limb Ataxia:  0=Absent   8. Sensory: 0=Normal; no sensory loss   9. Best Language:  0=No aphasia, normal   10. Dysarthria: 0=Normal articulation   11. Extinction and Inattention (formerly Neglect): 0=No abnormality   Total Score: 3     Scheduled Medications:    aspirin, 81 mg, Oral, Daily  atorvastatin, 40 mg, Oral, QPM  buPROPion, 150 mg, Oral, Daily  heparin (porcine), 5,000 Units, Subcutaneous, Q8H KYRA  montelukast, 10 mg, Oral, HS  OLANZapine, 15 mg, Oral, HS      Continuous IV Infusions:  dextrose 5 % and sodium chloride 0.9 %, 75 mL/hr, Intravenous, Continuous      PRN Meds:  acetaminophen, 650 mg, Oral, Q4H PRN      ED Triage Vitals   Temperature Pulse Respirations Blood Pressure SpO2 Pain Score   11/10/24 0900 11/10/24 0826 11/10/24 0826 11/10/24 0826 11/10/24 0826  11/10/24 1400   97.9 °F (36.6 °C) 88 16 142/73 99 % No Pain     Weight (last 2 days)       Date/Time Weight    11/11/24 0407 43.5 (96)    11/10/24 0845 43.6 (96.12)            Vital Signs (last 3 days)       Date/Time Temp Pulse Resp BP MAP  SpO2 O2 Device Polo Coma Scale Score Pain    11/11/24 0900 -- -- -- -- -- -- -- -- No Pain    11/11/24 0845 -- -- -- -- -- -- None (Room air) 15 --    11/11/24 0417 -- -- -- -- -- -- None (Room air) 15 No Pain    11/11/24 0407 97.7 °F (36.5 °C) 88 20 125/62 83 97 % -- -- --    11/11/24 0330 -- 92 16 -- -- 99 % -- -- --    11/11/24 0300 -- 100 14 130/57 82 95 % -- -- --    11/11/24 0200 -- 108 19 129/60 87 98 % -- -- --    11/11/24 0000 -- 70 15 135/61 88 96 % -- 15 No Pain    11/10/24 2206 -- 84 16 110/53 -- 98 % -- -- --    11/10/24 2200 -- -- -- -- -- -- -- 15 --    11/10/24 2158 -- 84 16 -- -- 96 % -- -- --    11/10/24 2008 -- 84 17 119/56 81 97 % None (Room air) -- No Pain    11/10/24 2000 -- -- -- -- -- -- -- 15 2    11/10/24 1800 -- 80 16 110/59 79 98 % -- 15 No Pain    11/10/24 1700 -- 88 15 106/53 72 97 % -- 15 No Pain    11/10/24 1600 -- 84 16 106/51 74 97 % -- 15 No Pain    11/10/24 1500 -- 80 13 105/59 79 98 % -- 15 No Pain    11/10/24 1400 -- 80 12 125/58 84 99 % None (Room air) 15 No Pain    11/10/24 1300 -- 82 12 122/57 82 98 % -- 15 --    11/10/24 1200 -- 82 13 127/60 86 97 % -- 15 --    11/10/24 1100 -- 88 13 125/60 87 97 % -- 15 --    11/10/24 1000 -- 88 18 116/59 82 99 % None (Room air) 15 --    11/10/24 0900 97.9 °F (36.6 °C) 97 18 133/72 -- 98 % None (Room air) 14 --    11/10/24 0845 -- 77 18 215/81 -- 98 % None (Room air) 14 --    11/10/24 08:30:53 -- 77 18 215/81 -- 98 % None (Room air) -- --    11/10/24 0826 -- 88 16 142/73 -- 99 % None (Room air) 13 --              Pertinent Labs/Diagnostic Test Results:   Radiology:  CT chest abdomen pelvis wo contrast   Final (11/11 0842)      Superior right lower lobe groundglass opacities, likely  infectious/inflammatory and possibly due to aspiration.      No acute findings in the abdomen or pelvis.      No evidence of malignancy.         CTA stroke alert (head/neck)   Final   (11/10 0901)         1. Moderate stenoses of the origins of both vertebral arteries with the vessel is patent distally.   2. Moderate stenosis of the left subclavian artery origin approximately 50%.   3. Carotid arterial system is patent with mild atherosclerotic disease.   4. No intracranial aneurysm or major intracranial arterial stenosis.         CT stroke alert brain   Final  (11/10 0836)         1. No acute intracranial hemorrhage, mass effect or edema.   2. Right pterional craniotomy noted.      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     GI:  No orders to display           Results from last 7 days   Lab Units 11/11/24  0646 11/10/24  0903   WBC Thousand/uL 7.74 7.63   HEMOGLOBIN g/dL 10.8* 10.0*   HEMATOCRIT % 34.3* 30.8*   PLATELETS Thousands/uL 322 325   TOTAL NEUT ABS Thousands/µL 5.32  --          Results from last 7 days   Lab Units 11/11/24  0646 11/10/24  0903   SODIUM mmol/L 143 133*   POTASSIUM mmol/L 4.0 4.4   CHLORIDE mmol/L 111* 103   CO2 mmol/L 25 25   ANION GAP mmol/L 7 5   BUN mg/dL 10 17   CREATININE mg/dL 0.95 0.89   EGFR ml/min/1.73sq m 57 62   CALCIUM mg/dL 8.5 7.4*   MAGNESIUM mg/dL 2.3  --    PHOSPHORUS mg/dL 3.4  --      Results from last 7 days   Lab Units 11/11/24  0646   AST U/L 21   ALT U/L 14   ALK PHOS U/L 50   TOTAL PROTEIN g/dL 5.8*   ALBUMIN g/dL 3.6   TOTAL BILIRUBIN mg/dL 0.31     Results from last 7 days   Lab Units 11/11/24  1011 11/11/24  0752 11/11/24  0633 11/11/24  0446 11/11/24  0202  11/11/24  0015 11/10/24  2203 11/10/24  2006 11/10/24  1806 11/10/24  1714 11/10/24  1216 11/10/24  0939   POC GLUCOSE mg/dl 225* 153* 171* 170* 151* 138 170* 198* 234* 37* 123 238*     Results from last 7 days   Lab Units 11/11/24  0646 11/10/24  0903   GLUCOSE RANDOM mg/dL 169* 318*         Results from last 7 days   Lab Units 11/11/24  0646   HEMOGLOBIN A1C % 5.9*   EAG mg/dl 123     Results from last 7 days   Lab Units 11/10/24  1317 11/10/24  1109 11/10/24  0903   HS TNI 0HR ng/L  --   --  11   HS TNI 2HR ng/L  --  23  --    HSTNI D2 ng/L  --  12  --    HS TNI 4HR ng/L 24  --   --    HSTNI D4 ng/L 13  --   --          Results from last 7 days   Lab Units 11/10/24  0903   PROTIME seconds 14.3   INR  1.08   PTT seconds 27     Results from last 7 days   Lab Units 11/11/24  0646   FERRITIN ng/mL 56   IRON SATURATION % 29   IRON ug/dL 66   TIBC ug/dL 231*     Past Medical History:   Diagnosis Date    Asthma     Coronary artery disease     Depression     Diabetes mellitus (HCC)     Hyperlipidemia     Hypertension     Subdural hematoma (HCC)     SVT (supraventricular tachycardia) (HCC)      Present on Admission:   Stroke-like symptoms   Coronary artery disease involving native coronary artery of native heart with angina pectoris (HCC)   Primary hypertension   Asthma   Acid reflux   Chest pain   Anemia   Paroxysmal SVT (supraventricular tachycardia) ([facility])      Admitting Diagnosis:     Altered mental status [R41.82]  Hypoglycemia [E16.2]  CVA (cerebral vascular accident) (HCC) [I63.9]  Exertional chest pain [R07.9]  Stroke-like symptoms [R29.90]  National Institutes of Health (NIH) Stroke Scale level of consciousness score 0, alert; keenly responsive [Z78.9]    Age/Sex: 79 y.o. female    Network Utilization Review Department  ATTENTION: Please call with any questions or concerns to [phone] and carefully listen to the prompts so that you are directed to the right person. All voicemails are confidential.   For  Discharge needs, contact Care Management DC Support Team at [phone] opt. 2  Send all requests for admission clinical reviews, approved or denied determinations and any other requests to dedicated fax number below belonging to the campus where the patient is receiving treatment. List of dedicated fax numbers for the Facilities:  FACILITY NAME UR FAX NUMBER   ADMISSION DENIALS (Administrative/Medical Necessity) [phone]   DISCHARGE SUPPORT TEAM (NETWORK) [phone]   PARENT CHILD HEALTH (Maternity/NICU/Pediatrics) [phone]   [facility] [phone]   [facility] [phone]   [facility] [phone]   [facility] [phone]   [facility] [phone]   [facility] [phone]   [facility] [phone]   [facility] [phone]   [facility] [phone]   [facility] [phone]   [facility] [phone]   [facility] [phone]

## 2024-11-11 NOTE — PLAN OF CARE
Problem: OCCUPATIONAL THERAPY ADULT  Goal: Performs self-care activities at highest level of function for planned discharge setting.  See evaluation for individualized goals.  Description: Treatment Interventions: ADL retraining, Functional transfer training, UE strengthening/ROM, Endurance training, Cognitive reorientation, Patient/family training, Equipment evaluation/education, Compensatory technique education, Energy conservation, Activityengagement          See flowsheet documentation for full assessment, interventions and recommendations.   Note: Limitation: Decreased ADL status, Decreased UE strength, Decreased cognition, Decreased endurance, Decreased self-care trans, Decreased high-level ADLs  Prognosis: Fair  Assessment: Pt is a 79 y.o. female admitted to [facility] on 11/10/24. Pt presented to ED with stroke-like symptoms, including R sided weakness/waxing waning, decreased po intakes and hypoglycemia. Pt w/moderate L subclavian and veterbral 50% stenosis. Pt has a past medical history of Asthma, Coronary artery disease, Depression, Diabetes mellitus ([facility]), Hyperlipidemia, Hypertension, Subdural hematoma ([facility]), and SVT (supraventricular tachycardia) ([facility]). Currently, pt lives with her  in a 3 . At baseline, pt required assistance with ADLs and IADLs, and independent with functional mobility. As mentioned, the pt's  reports that the pt has required increasing assistance with ADL/IADL completion since their daughter with CP passed away. 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 Pt currently presents with impairments including, difficulty performing ADLS, difficulty performing IADLS, health management, environment activity tolerance, endurance, standing balance/tolerance, sitting balance/tolerance, safety, judgement, sequencing, task initiation, task termination, and difficulty with leisure exploration. These impairments, as well as pt's fatigue, risk for falls, and home environment  limit pt's ability to safely engage in all baseline areas of occupation, including grooming, bathing, dressing, toileting, functional mobility/transfers, community mobility, social participation, and leisure activities, however has family support at home if necessary. From OT standpoint, recommend Level II resources. OT will continue to follow to address the below stated goals.     Rehab Resource Intensity Level, OT: II (Moderate Resource Intensity)

## 2024-11-11 NOTE — PLAN OF CARE
Problem: PHYSICAL THERAPY ADULT  Goal: Performs mobility at highest level of function for planned discharge setting.  See evaluation for individualized goals.  Description: Treatment/Interventions: LE strengthening/ROM, Functional transfer training, ADL retraining, Therapeutic exercise, Endurance training, Cognitive reorientation, Patient/family training, Equipment eval/education, Bed mobility, Gait training, Compensatory technique education, Spoke to nursing, Spoke to case management, OT, Family          See flowsheet documentation for full assessment, interventions and recommendations.  Note: Prognosis: Fair  Problem List: Decreased strength, Decreased endurance, Impaired balance, Decreased mobility, Impaired judgement, Decreased safety awareness   Pt is 79 y.o. female seen for PT evaluation s/p admit to [facility] from home on 11/10/2024  w/ Stroke-like symptoms- including R sided weakness / waxing waning; decreased po intakes and hypoglycemia; Pt w/ moderate L subclavian and veterbral 50% stenosis. MRI (P) and CTB(-) for acute infarct.  Pt  has a past medical history of Asthma, Coronary artery disease, Depression, Diabetes mellitus (HCC), Hyperlipidemia, Hypertension, Subdural hematoma (HCC), and SVT (supraventricular tachycardia) ([facility]).   Personal factors affecting pt at time of IE include: steps to enter environment, , advanced age, past experience, inability to perform IADLs, inability to perform ADLs, inability to ambulate household distances, limited insight into impairments and recent  near fall(s); decreased po intake; limited engagement and probable depression as contributing factor as noted by spouse.  Due to acute medical issues, ongoing medical workup for primary dx; pain, fall risk, increased reliance on more restrictive AD compared to baseline;  decreased activity tolerance compared to baseline, increased assistance needed from caregiver at current time, continuous monitoring, trending  labs;  telemetry; cognition; limited engagement ; flat affect; decline in function past years; multiple lines, decline in overall functional mobility status; health management issues; note unstable clinical picture (high complexity). Pt presents w/ flat affect- minimal eye contact and spouse providing most history. Pt and spouse live together w/ thir son and his 2 kids in a 3SH w/ 6STE. Pt and spouse stay on FF (no bath located on this level) and pt 1* has been using BSC. Per spouse pt does not get changed every day and mostly stays in bed/ doesn't eat much and is able to get on and off toilet w/ S; ambulate short distances w/o DME; he drives pt to appointments and A w/ ADLs and completes all IADLs. Spouse reports pt w/ gradual decline since their daughter who had CP passed away ~3 years prior- pt was her 1* caregiver for 45 years. Currently,  pt  is requiring minaAx1 A for bed skills; modAx1 for functional transfers and modAx1 for ambulation w/ HHA + pelvic support 22' w/ distances limited by LE instability/ weakness.    Pt presents functioning below baseline and currently w/ overall mobility deficits 2* to: decreased LE strength/AROM (generalized weakness/ equal B/Ly) ;frail; decreased endurance; decreased activity tolerance; impaired balance; gait deviations; decreased safety awareness; SOB/SCHULZ; fatigue; impaired safety and judgement; limited insight into current deficits; bed/ chair alarms; multiple lines;flat affect; minimal verbalizations; limited eye contact.   Pt currently at risk for falls.  (Please find additional objective findings from PT assessment regarding body systems outlined above.) Pt will continue to benefit from skilled PT interventions to address stated impairments; to maximize functional potential; for ongoing pt/ family training; and DME needs.  PT is recommending PT Resource Intensity Level  2 on d/c + pastoral care consult; consider depression screening w/ MD/ team/ outpt psych or  counseling + geriatrics consult     PT will follow for STG as outlined on eval.  Pt/ family agreeable to PT POC and goals as stated.         Rehab Resource Intensity Level, PT: II (Moderate Resource Intensity)    See flowsheet documentation for full assessment.

## 2024-11-11 NOTE — PROGRESS NOTES
Pastoral Care Progress Note          Chaplaincy Interventions Utilized:   Empowerment: Clarified, confirmed, or reviewed information from treatment team , Encouraged focus on present, and Encouraged self-care    Exploration: Explored emotional needs & resources and Explored spiritual needs & resources    Collaboration: Encouraged adherence to treatment plan     Relationship Building: Cultivated a relationship of care and support, Listened empathically, and Hospitality    Ritual:        11/11/24 1300   Clinical Encounter Type   Visited With Patient and family together   Referral From Nurse   Referral To              Chaplaincy Outcomes Achieved:  Expressed gratitude     visited this patient and her  in her room.   listened as the patient talked and offered her support.       Spiritual Coping Strategies Utilized:        remains available.

## 2024-11-12 ENCOUNTER — TELEPHONE (OUTPATIENT)
Dept: CARDIOLOGY CLINIC | Facility: CLINIC | Age: 79
End: 2024-11-12

## 2024-11-12 PROBLEM — E44.0 MODERATE PROTEIN-CALORIE MALNUTRITION (HCC): Status: ACTIVE | Noted: 2024-11-12

## 2024-11-12 PROBLEM — G93.41 METABOLIC ENCEPHALOPATHY: Status: ACTIVE | Noted: 2024-11-10

## 2024-11-12 LAB
ANION GAP SERPL CALCULATED.3IONS-SCNC: 7 MMOL/L (ref 4–13)
BASOPHILS # BLD AUTO: 0.04 THOUSANDS/ÂΜL (ref 0–0.1)
BASOPHILS NFR BLD AUTO: 1 % (ref 0–1)
BUN SERPL-MCNC: 6 MG/DL (ref 5–25)
CALCIUM SERPL-MCNC: 8.5 MG/DL (ref 8.4–10.2)
CHLORIDE SERPL-SCNC: 111 MMOL/L (ref 96–108)
CO2 SERPL-SCNC: 26 MMOL/L (ref 21–32)
CREAT SERPL-MCNC: 0.83 MG/DL (ref 0.6–1.3)
EOSINOPHIL # BLD AUTO: 0.1 THOUSAND/ÂΜL (ref 0–0.61)
EOSINOPHIL NFR BLD AUTO: 1 % (ref 0–6)
ERYTHROCYTE [DISTWIDTH] IN BLOOD BY AUTOMATED COUNT: 14.4 % (ref 11.6–15.1)
GFR SERPL CREATININE-BSD FRML MDRD: 68 ML/MIN/1.73SQ M
GLUCOSE SERPL-MCNC: 103 MG/DL (ref 65–140)
GLUCOSE SERPL-MCNC: 130 MG/DL (ref 65–140)
GLUCOSE SERPL-MCNC: 151 MG/DL (ref 65–140)
GLUCOSE SERPL-MCNC: 158 MG/DL (ref 65–140)
GLUCOSE SERPL-MCNC: 169 MG/DL (ref 65–140)
GLUCOSE SERPL-MCNC: 260 MG/DL (ref 65–140)
GLUCOSE SERPL-MCNC: 88 MG/DL (ref 65–140)
HCT VFR BLD AUTO: 34.3 % (ref 34.8–46.1)
HGB BLD-MCNC: 11 G/DL (ref 11.5–15.4)
IMM GRANULOCYTES # BLD AUTO: 0.02 THOUSAND/UL (ref 0–0.2)
IMM GRANULOCYTES NFR BLD AUTO: 0 % (ref 0–2)
LYMPHOCYTES # BLD AUTO: 2.09 THOUSANDS/ÂΜL (ref 0.6–4.47)
LYMPHOCYTES NFR BLD AUTO: 29 % (ref 14–44)
MCH RBC QN AUTO: 28.6 PG (ref 26.8–34.3)
MCHC RBC AUTO-ENTMCNC: 32.1 G/DL (ref 31.4–37.4)
MCV RBC AUTO: 89 FL (ref 82–98)
MONOCYTES # BLD AUTO: 0.69 THOUSAND/ÂΜL (ref 0.17–1.22)
MONOCYTES NFR BLD AUTO: 10 % (ref 4–12)
NEUTROPHILS # BLD AUTO: 4.26 THOUSANDS/ÂΜL (ref 1.85–7.62)
NEUTS SEG NFR BLD AUTO: 59 % (ref 43–75)
NRBC BLD AUTO-RTO: 0 /100 WBCS
PLATELET # BLD AUTO: 345 THOUSANDS/UL (ref 149–390)
PMV BLD AUTO: 10 FL (ref 8.9–12.7)
POTASSIUM SERPL-SCNC: 4 MMOL/L (ref 3.5–5.3)
RBC # BLD AUTO: 3.85 MILLION/UL (ref 3.81–5.12)
SODIUM SERPL-SCNC: 144 MMOL/L (ref 135–147)
WBC # BLD AUTO: 7.2 THOUSAND/UL (ref 4.31–10.16)

## 2024-11-12 PROCEDURE — 82948 REAGENT STRIP/BLOOD GLUCOSE: CPT

## 2024-11-12 PROCEDURE — 80048 BASIC METABOLIC PNL TOTAL CA: CPT | Performed by: NURSE PRACTITIONER

## 2024-11-12 PROCEDURE — 99232 SBSQ HOSP IP/OBS MODERATE 35: CPT | Performed by: PHYSICIAN ASSISTANT

## 2024-11-12 PROCEDURE — 85025 COMPLETE CBC W/AUTO DIFF WBC: CPT | Performed by: NURSE PRACTITIONER

## 2024-11-12 PROCEDURE — 99232 SBSQ HOSP IP/OBS MODERATE 35: CPT | Performed by: INTERNAL MEDICINE

## 2024-11-12 RX ORDER — AMOXICILLIN 250 MG
1 CAPSULE ORAL
Status: DISCONTINUED | OUTPATIENT
Start: 2024-11-12 | End: 2024-11-16 | Stop reason: HOSPADM

## 2024-11-12 RX ORDER — ALPRAZOLAM 0.25 MG/1
0.25 TABLET ORAL ONCE AS NEEDED
Status: COMPLETED | OUTPATIENT
Start: 2024-11-12 | End: 2024-11-12

## 2024-11-12 RX ORDER — POLYETHYLENE GLYCOL 3350 17 G/17G
17 POWDER, FOR SOLUTION ORAL DAILY PRN
Status: DISCONTINUED | OUTPATIENT
Start: 2024-11-12 | End: 2024-11-14

## 2024-11-12 RX ADMIN — ATORVASTATIN CALCIUM 40 MG: 40 TABLET, FILM COATED ORAL at 17:26

## 2024-11-12 RX ADMIN — MONTELUKAST 10 MG: 10 TABLET, FILM COATED ORAL at 21:40

## 2024-11-12 RX ADMIN — INSULIN LISPRO 2 UNITS: 100 INJECTION, SOLUTION INTRAVENOUS; SUBCUTANEOUS at 21:41

## 2024-11-12 RX ADMIN — ASPIRIN 81 MG CHEWABLE TABLET 81 MG: 81 TABLET CHEWABLE at 08:22

## 2024-11-12 RX ADMIN — BUPROPION HYDROCHLORIDE 150 MG: 150 TABLET, EXTENDED RELEASE ORAL at 08:22

## 2024-11-12 RX ADMIN — POLYETHYLENE GLYCOL 3350 17 G: 17 POWDER, FOR SOLUTION ORAL at 16:43

## 2024-11-12 RX ADMIN — HEPARIN SODIUM 5000 UNITS: 5000 INJECTION INTRAVENOUS; SUBCUTANEOUS at 05:09

## 2024-11-12 RX ADMIN — HEPARIN SODIUM 5000 UNITS: 5000 INJECTION INTRAVENOUS; SUBCUTANEOUS at 16:11

## 2024-11-12 RX ADMIN — ALPRAZOLAM 0.25 MG: 0.25 TABLET ORAL at 00:50

## 2024-11-12 RX ADMIN — SENNOSIDES AND DOCUSATE SODIUM 1 TABLET: 50; 8.6 TABLET ORAL at 21:39

## 2024-11-12 RX ADMIN — INSULIN LISPRO 1 UNITS: 100 INJECTION, SOLUTION INTRAVENOUS; SUBCUTANEOUS at 12:16

## 2024-11-12 RX ADMIN — ACETAMINOPHEN 650 MG: 325 TABLET, FILM COATED ORAL at 16:11

## 2024-11-12 RX ADMIN — OLANZAPINE 15 MG: 10 TABLET, FILM COATED ORAL at 21:39

## 2024-11-12 RX ADMIN — HEPARIN SODIUM 5000 UNITS: 5000 INJECTION INTRAVENOUS; SUBCUTANEOUS at 21:40

## 2024-11-12 RX ADMIN — INSULIN LISPRO 1 UNITS: 100 INJECTION, SOLUTION INTRAVENOUS; SUBCUTANEOUS at 02:14

## 2024-11-12 NOTE — PROGRESS NOTES
"Progress Note - Hospitalist   Name: Tavia Eldridge 79 y.o. female I MRN: [id number]  [facility] I Date of Admission: 11/10/2024   Date of Service: 11/11/2024 I Hospital Day: 1    Assessment & Plan  Stroke-like symptoms  Waxing and waning right sided weakness and numbness since 3 days, confusion since last night. Recurrence of right sided weakness and numbness this morning and she was brought to the ED by her family  Blood glucose 39 on arrival  CT head - No acute intracranial hemorrhage, mass effect or edema. Right pterional craniotomy noted.  CTA head and neck - Moderate stenoses of the origins of both vertebral arteries with the vessel is patent distally. Moderate stenosis of the left subclavian artery origin approximately 50%. Carotid arterial system is patent with mild atherosclerotic disease. No intracranial aneurysm or major intracranial arterial stenosis.   Due to hypoglycemia vs CVA  Seen by neurology - received  mg, then 81 mg daily, ct home Atorvastatin 40 mg daily  MRI brain, echo, FLP, HbA1c, neurochecks  Hypoglycemia  BG 39 on arrival  Received Lantus 19 units yesterday evening  Pt reports poor oral intake for at least 2 months  Discussion with pts  at bedside he reports he is constantly nauseated dt being on Jardiance therefore he doesn't desire food. So he tries to make a sandwich for his wife and him and they may share one sandwich. Discussed pts need for improved nutrition and even though he doesn't desire food, it is important to encourage improved nutrition . It is ok for her to snack since she reports that she cannot eat three \"square meals\" a day   Received 25 ml D50 and on D5NS- will discontinue this   Latest blood sugar 123  Regular diet  Ct D5NS at lower rate - 40 ml/hr  Monitor blood sugars   Chest pain  Complains of retrosternal chest pressure with shortness of breath and relieved with rest with occasional episodes at rest since a few months  H/o CAD - NSTEMI in " 2022, cath with 50% stenosis of proximal RCA  EKG - mild inferolateral ST depression  Trop - neg x 3  Ct ASA, statin  BB when normotension permitted by neurology  F/u echo  Cardiology input appreciated  Type 2 diabetes mellitus, with long-term current use of insulin ([facility])  Lab Results   Component Value Date    HGBA1C 5.9 (H) 11/11/2024       Recent Labs     11/11/24  1209 11/11/24  1513 11/11/24  1636 11/11/24 1954   POCGLU 145* 199* 131 218*       Blood Sugar Average: Last 72 hrs:  (P) 161.3733777746700546  With hypoglycemia as above  Home regimen: Lantus 19 hs, Repaglinide 1 mg TID  Hold Lantus/Repaglinide  Blood sugars being monitored  No sliding scale today , blood sugars starting to recover off of d5ns.   We will now add low dose algo 1 for tonight at hs and again chk at 2 am to catch any hypoglycemia should it occur  Once sugars improved by am can then add pm lantus and increase algorithm     Coronary artery disease involving native coronary artery of native heart with angina pectoris (HCC)  As above  Asthma  No exacerbation  Ct home Singulair  Albuterol prn  Primary hypertension  Home regimen: Lisinopril 10 mg daily  Hold meds in the setting of possible acute CVA requiring permissive hypertension  With a goal fo < 220/110 for the first 24 hrs  Continues to run lower will wait for MRI brain before increasing antihypertensives   Will need BB as above when normotension permitted by neurology  Weight loss/loss of appetite  Weight loss of around 7 lbs in a few weeks   Progressively worsening loss of appetite, abdominal bloating  F/u CT C/A/P  Hyperlipidemia  Ct Atorvastatin 40 mg daily  Check FLP  Anemia  Drop in Hb to 10 from 12 to 13 in 2023  W/u  Depression  Ct home meds Bupropion  mg daily, Olanzapine 15 mg daily  Paroxysmal SVT (supraventricular tachycardia) ([facility])  H/o paroxysmal SVT  Monitor on telemetry  History of subdural hematoma  H/o SDH many years ago - s/p right frontotemporal craniotomy  CT  head today - no acute abnormality    VTE Pharmacologic Prophylaxis: VTE Score: 3 High Risk (Score >/= 5) - Pharmacological DVT Prophylaxis Ordered: heparin. Sequential Compression Devices Ordered.    Mobility:   Basic Mobility Inpatient Raw Score: 13  JH-HLM Goal: 4: Move to chair/commode  JH-HLM Achieved: 4: Move to chair/commode  JH-HLM Goal achieved. Continue to encourage appropriate mobility.    Patient Centered Rounds: I performed bedside rounds with nursing staff today.   Discussions with Specialists or Other Care Team Provider: nursing     Education and Discussions with Family / Patient: Updated  () at bedside.    Current Length of Stay: 1 day(s)  Current Patient Status: Inpatient   Certification Statement: The patient will continue to require additional inpatient hospital stay due to ro stroke with hypoglycemia   Discharge Plan: Anticipate discharge in 48-72 hrs to will need to determine per pt to ? Need for rehab since pt is so week     Code Status: Level 1 - Full Code    Subjective   Extensive conversation at bedside with pt and her . Pt reports weight loss very poor appetite and depression . She states she has no desire to eat. We discussed her need to eat to survive. 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 This has now caused a void and pt has fallen into a depression. Pt denies not wanting to live. She states she wants to live she has two other kids. She states she will try to eat. She states she does better with snacking small meals rather than 3 large meals a day.     Objective :  Temp:  [97.7 °F (36.5 °C)-98 °F (36.7 °C)] 98 °F (36.7 °C)  HR:  [] 79  BP: (103-135)/(52-65) 103/52  Resp:  [14-20] 18  SpO2:  [95 %-100 %] 96 %  O2 Device: None (Room air)    Body mass index is 17.01 kg/m².     Input and Output Summary (last 24 hours):     Intake/Output Summary (Last  24 hours) at 11/11/2024 2003  Last data filed at 11/11/2024 1850  Gross per 24 hour   Intake 2073.58 ml   Output --   Net 2073.58 ml       Physical Exam  Constitutional:       General: She is not in acute distress.     Appearance: She is obese. She is not ill-appearing, toxic-appearing or diaphoretic.   HENT:      Mouth/Throat:      Pharynx: No oropharyngeal exudate.   Eyes:      General:         Right eye: No discharge.         Left eye: No discharge.   Cardiovascular:      Rate and Rhythm: Normal rate.      Heart sounds: No murmur heard.     No friction rub. No gallop.   Pulmonary:      Effort: No respiratory distress.      Breath sounds: No stridor. No wheezing, rhonchi or rales.   Chest:      Chest wall: No tenderness.   Abdominal:      General: There is no distension.      Palpations: There is no mass.      Tenderness: There is no abdominal tenderness. There is no guarding or rebound.      Hernia: No hernia is present.   Musculoskeletal:         General: No swelling, tenderness, deformity or signs of injury.      Right lower leg: No edema.      Left lower leg: No edema.   Skin:     Coloration: Skin is pale. Skin is not jaundiced.      Findings: No bruising, erythema, lesion or rash.   Neurological:      Mental Status: She is alert and oriented to person, place, and time.   Psychiatric:      Comments: Very depressed appearing            Lines/Drains:        Telemetry:  Telemetry Orders (From admission, onward)               24 Hour Telemetry Monitoring  Continuous x 24 Hours (Telem)        Question:  Reason for 24 Hour Telemetry  Answer:  TIA/Suspected CVA/ Confirmed CVA                     Telemetry Reviewed: Normal Sinus Rhythm  Indication for Continued Telemetry Use: Acute CVA               Lab Results: I have reviewed the following results:   Results from last 7 days   Lab Units 11/11/24  0646   WBC Thousand/uL 7.74   HEMOGLOBIN g/dL 10.8*   HEMATOCRIT % 34.3*   PLATELETS Thousands/uL 322   SEGS PCT % 69    LYMPHO PCT % 20   MONO PCT % 10   EOS PCT % 1     Results from last 7 days   Lab Units 11/11/24  0646   SODIUM mmol/L 143   POTASSIUM mmol/L 4.0   CHLORIDE mmol/L 111*   CO2 mmol/L 25   BUN mg/dL 10   CREATININE mg/dL 0.95   ANION GAP mmol/L 7   CALCIUM mg/dL 8.5   ALBUMIN g/dL 3.6   TOTAL BILIRUBIN mg/dL 0.31   ALK PHOS U/L 50   ALT U/L 14   AST U/L 21   GLUCOSE RANDOM mg/dL 169*     Results from last 7 days   Lab Units 11/10/24  0903   INR  1.08     Results from last 7 days   Lab Units 11/11/24  1954 11/11/24  1636 11/11/24  1513 11/11/24  1209 11/11/24  1011 11/11/24  0752 11/11/24  0633 11/11/24  0446 11/11/24  0202 11/11/24  0015 11/10/24  2203 11/10/24  2006   POC GLUCOSE mg/dl 218* 131 199* 145* 225* 153* 171* 170* 151* 138 170* 198*     Results from last 7 days   Lab Units 11/11/24  0646   HEMOGLOBIN A1C % 5.9*           Recent Cultures (last 7 days):         Imaging Results Review: I reviewed radiology reports from this admission including: CT abdomen/pelvis and MRI brain.  Other Study Results Review: No additional pertinent studies reviewed.    Last 24 Hours Medication List:     Current Facility-Administered Medications:     acetaminophen (TYLENOL) tablet 650 mg, Q4H PRN    aspirin chewable tablet 81 mg, Daily    atorvastatin (LIPITOR) tablet 40 mg, QPM    buPROPion (WELLBUTRIN XL) 24 hr tablet 150 mg, Daily    cyanocobalamin injection 1,000 mcg, Q30 Days    dextrose 5 % and sodium chloride 0.9 % infusion, Continuous, Last Rate: Stopped (11/11/24 1057)    heparin (porcine) subcutaneous injection 5,000 Units, Q8H KYRA    montelukast (SINGULAIR) tablet 10 mg, HS    OLANZapine (ZyPREXA) tablet 15 mg, HS    Administrative Statements   Today, Patient Was Seen By: ROSE Krishnan      **Please Note: This note may have been constructed using a voice recognition system.**

## 2024-11-12 NOTE — ASSESSMENT & PLAN NOTE
"BG 39 on arrival  Received Lantus 19 units yesterday evening  Pt reports poor oral intake for at least 2 months  Discussion with pts  at bedside he reports he is constantly nauseated dt being on Jardiance therefore he doesn't desire food. So he tries to make a sandwich for his wife and him and they may share one sandwich. Discussed pts need for improved nutrition and even though he doesn't desire food, it is important to encourage improved nutrition . It is ok for her to snack since she reports that she cannot eat three \"square meals\" a day   Received 25 ml D50 and on D5NS- will discontinue this   Latest blood sugar 123  Regular diet  Ct D5NS at lower rate - 40 ml/hr  Monitor blood sugars   "

## 2024-11-12 NOTE — ASSESSMENT & PLAN NOTE
Complains of retrosternal chest pressure with shortness of breath and relieved with rest with occasional episodes at rest since a few months  H/o CAD - NSTEMI in 2022, cath with 50% stenosis of proximal RCA  Troponin negative  Cardiology following, no further IP work up, can f/u outpt   Unclear why she is not on BB but BP is on lower side, would hold on starting this for now, defer to outpt providers

## 2024-11-12 NOTE — PLAN OF CARE
Problem: Prexisting or High Potential for Compromised Skin Integrity  Goal: Skin integrity is maintained or improved  Description: INTERVENTIONS:  - Identify patients at risk for skin breakdown  - Assess and monitor skin integrity  - Assess and monitor nutrition and hydration status  - Monitor labs   - Assess for incontinence   - Turn and reposition patient  - Assist with mobility/ambulation  - Relieve pressure over bony prominences  - Avoid friction and shearing  - Provide appropriate hygiene as needed including keeping skin clean and dry  - Evaluate need for skin moisturizer/barrier cream  - Collaborate with interdisciplinary team   - Patient/family teaching  - Consider wound care consult   Outcome: Progressing     Problem: PAIN - ADULT  Goal: Verbalizes/displays adequate comfort level or baseline comfort level  Description: Interventions:  - Encourage patient to monitor pain and request assistance  - Assess pain using appropriate pain scale  - Administer analgesics based on type and severity of pain and evaluate response  - Implement non-pharmacological measures as appropriate and evaluate response  - Consider cultural and social influences on pain and pain management  - Notify physician/advanced practitioner if interventions unsuccessful or patient reports new pain  Outcome: Progressing     Problem: INFECTION - ADULT  Goal: Absence or prevention of progression during hospitalization  Description: INTERVENTIONS:  - Assess and monitor for signs and symptoms of infection  - Monitor lab/diagnostic results  - Monitor all insertion sites, i.e. indwelling lines, tubes, and drains  - Monitor endotracheal if appropriate and nasal secretions for changes in amount and color  - Jet appropriate cooling/warming therapies per order  - Administer medications as ordered  - Instruct and encourage patient and family to use good hand hygiene technique  - Identify and instruct in appropriate isolation precautions for  identified infection/condition  Outcome: Progressing

## 2024-11-12 NOTE — ASSESSMENT & PLAN NOTE
Lab Results   Component Value Date    HGBA1C 5.9 (H) 11/11/2024       Recent Labs     11/11/24  1209 11/11/24  1513 11/11/24  1636 11/11/24 1954   POCGLU 145* 199* 131 218*       Blood Sugar Average: Last 72 hrs:  (P) 161.7338915038024080  With hypoglycemia as above  Home regimen: Lantus 19 hs, Repaglinide 1 mg TID  Hold Lantus/Repaglinide  Blood sugars being monitored  No sliding scale today , blood sugars starting to recover off of d5ns.   We will now add low dose algo 1 for tonight at hs and again chk at 2 am to catch any hypoglycemia should it occur  Once sugars improved by am can then add pm lantus and increase algorithm

## 2024-11-12 NOTE — ASSESSMENT & PLAN NOTE
Home regimen: Lisinopril 10 mg daily  Hold meds in the setting of possible acute CVA requiring permissive hypertension  With a goal fo < 220/110 for the first 24 hrs  Continues to run lower will wait for MRI brain before increasing antihypertensives   Will need BB as above when normotension permitted by neurology

## 2024-11-12 NOTE — ASSESSMENT & PLAN NOTE
Drop in Hb to 10 from baseline of 12/13 in 2023   Iron panel, B12, folate unremarkable  Outpt work up/follow up.  Her hgb has been stable since admission.

## 2024-11-12 NOTE — PROGRESS NOTES
Progress Note - Hospitalist   Name: Tavia Eldridge 79 y.o. female I MRN: [id number]  Unit/Bed#: [facility] 612-01 I Date of Admission: 11/10/2024   Date of Service: 11/12/2024 I Hospital Day: 2    Assessment & Plan  Metabolic encephalopathy  Presented with R sided weakness, confusion, speech difficulty   Noted to be hypoglycemic with glucose in 30s.   CT head with no acute findings   CTA head and neck with moderate stenoses of the origins of both vertebral arteries, moderate stenosis of the left subclavian artery origin approximately 50%  Seen by neurology given concern for stroke like sx with R sided weakness   MRI brain negative, no acute findings   Likely all in the setting of hypoglycemia.  Sx have now resolved.  See below plans.   PT/OT plan for rehab   Hypoglycemia  Hypoglycemic on arrival and etiology of AMS as above.   With hx of T2DM on Lantus 19 units daily and Prandin outpt   A1C is 5.9. Pt has had poor appetite x months now.  Advise to stop Lantus and  Prandin on discharge and to only monitor with diet for now  Resolved   Type 2 diabetes mellitus, with long-term current use of insulin ([facility])  Lab Results   Component Value Date    HGBA1C 5.9 (H) 11/11/2024       Recent Labs     11/12/24  0023 11/12/24  0211 11/12/24  0806 11/12/24  1200   POCGLU 158* 151* 103 169*       Blood Sugar Average: Last 72 hrs:  (P) 157.1474290267582153  With hypoglycemia as above  Home regimen: Lantus 19 hs, Repaglinide 1 mg TID  Updated A1c 5.9, pt reports to poor appetite.  Recommend to stop/hold Lantus and Prandin at discharge, and monitor with diet only.  Should f/u with PCP for further long term mgmt.  Pt/ agreeable.   SSI, accu checks while IP.   Chest pain  Complains of retrosternal chest pressure with shortness of breath and relieved with rest with occasional episodes at rest since a few months  H/o CAD - NSTEMI in 2022, cath with 50% stenosis of proximal RCA  Troponin negative  Cardiology following, no further IP work  up, can f/u outpt   Unclear why she is not on BB but BP is on lower side, would hold on starting this for now, defer to outpt providers   Coronary artery disease involving native coronary artery of native heart with angina pectoris (HCC)  As above  Asthma  No exacerbation  Ct home Singulair  Albuterol prn  Primary hypertension  Home regimen: Lisinopril 10 mg daily, currently held with concern initially for possible stroke, will continue to hold with normotension   Weight loss/loss of appetite  Weight loss of around 7 lbs in a few weeks   Progressively worsening loss of appetite, abdominal bloating  CT C/A/P unremarkable with no sign of malignancy  Outpt f/u   Hyperlipidemia  Ct Atorvastatin 40 mg daily  Anemia  Drop in Hb to 10 from baseline of 12/13 in 2023   Iron panel, B12, folate unremarkable  Outpt work up/follow up.  Her hgb has been stable since admission.   Depression  Ct home meds Bupropion  mg daily, Olanzapine 15 mg daily  Paroxysmal SVT (supraventricular tachycardia) (HCC)  H/o paroxysmal SVT  History of subdural hematoma  H/o SDH many years ago - s/p right frontotemporal craniotomy  CT head obtained on admission with no acute findings     VTE Pharmacologic Prophylaxis: VTE Score: 3 Moderate Risk (Score 3-4) - Pharmacological DVT Prophylaxis Ordered: heparin.    Mobility:   Basic Mobility Inpatient Raw Score: 13  JH-HLM Goal: 4: Move to chair/commode  JH-HLM Achieved: 6: Walk 10 steps or more  JH-HLM Goal achieved. Continue to encourage appropriate mobility.    Patient Centered Rounds: I performed bedside rounds with nursing staff today.   Discussions with Specialists or Other Care Team Provider:     Education and Discussions with Family / Patient: Updated  () at bedside.    Current Length of Stay: 2 day(s)  Current Patient Status: Inpatient   Certification Statement: The patient will continue to require additional inpatient hospital stay due to safe dc planning to  rehab  Discharge Plan: Anticipate discharge in 24-48 hrs to rehab facility.    Code Status: Level 1 - Full Code    Subjective   Doing well today, no acute complaints     Objective :  Temp:  [97.6 °F (36.4 °C)-98.1 °F (36.7 °C)] 97.9 °F (36.6 °C)  HR:  [] 95  BP: (105-147)/(60-76) 105/60  Resp:  [16-20] 16  SpO2:  [94 %-98 %] 97 %  O2 Device: None (Room air)    Body mass index is 17.01 kg/m².     Input and Output Summary (last 24 hours):     Intake/Output Summary (Last 24 hours) at 11/12/2024 1527  Last data filed at 11/12/2024 1353  Gross per 24 hour   Intake 450 ml   Output --   Net 450 ml       Physical Exam  Vitals reviewed.   Constitutional:       General: She is not in acute distress.     Appearance: She is not toxic-appearing.   HENT:      Head: Normocephalic and atraumatic.   Eyes:      Extraocular Movements: Extraocular movements intact.   Pulmonary:      Effort: Pulmonary effort is normal. No respiratory distress.   Musculoskeletal:         General: Normal range of motion.   Neurological:      Mental Status: She is alert.      Comments: Forgetful    Psychiatric:         Mood and Affect: Mood normal.         Behavior: Behavior normal.         Thought Content: Thought content normal.         Lines/Drains:        Telemetry:  Telemetry Orders (From admission, onward)               24 Hour Telemetry Monitoring  Continuous x 24 Hours (Telem)        Expiring   Question:  Reason for 24 Hour Telemetry  Answer:  TIA/Suspected CVA/ Confirmed CVA                                  Lab Results: I have reviewed the following results:   Results from last 7 days   Lab Units 11/12/24  0532   WBC Thousand/uL 7.20   HEMOGLOBIN g/dL 11.0*   HEMATOCRIT % 34.3*   PLATELETS Thousands/uL 345   SEGS PCT % 59   LYMPHO PCT % 29   MONO PCT % 10   EOS PCT % 1     Results from last 7 days   Lab Units 11/12/24  0532 11/11/24  0646   SODIUM mmol/L 144 143   POTASSIUM mmol/L 4.0 4.0   CHLORIDE mmol/L 111* 111*   CO2 mmol/L 26 25   BUN  mg/dL 6 10   CREATININE mg/dL 0.83 0.95   ANION GAP mmol/L 7 7   CALCIUM mg/dL 8.5 8.5   ALBUMIN g/dL  --  3.6   TOTAL BILIRUBIN mg/dL  --  0.31   ALK PHOS U/L  --  50   ALT U/L  --  14   AST U/L  --  21   GLUCOSE RANDOM mg/dL 88 169*     Results from last 7 days   Lab Units 11/10/24  0903   INR  1.08     Results from last 7 days   Lab Units 11/12/24  1200 11/12/24  0806 11/12/24  0211 11/12/24  0023 11/11/24  2208 11/11/24  1954 11/11/24  1636 11/11/24  1513 11/11/24  1209 11/11/24  1011 11/11/24  0752 11/11/24  0633   POC GLUCOSE mg/dl 169* 103 151* 158* 149* 218* 131 199* 145* 225* 153* 171*     Results from last 7 days   Lab Units 11/11/24  0646   HEMOGLOBIN A1C % 5.9*           Recent Cultures (last 7 days):         Imaging Results Review: No pertinent imaging studies reviewed.  Other Study Results Review: No additional pertinent studies reviewed.    Last 24 Hours Medication List:     Current Facility-Administered Medications:     acetaminophen (TYLENOL) tablet 650 mg, Q4H PRN    aspirin chewable tablet 81 mg, Daily    atorvastatin (LIPITOR) tablet 40 mg, QPM    buPROPion (WELLBUTRIN XL) 24 hr tablet 150 mg, Daily    cyanocobalamin injection 1,000 mcg, Q30 Days    heparin (porcine) subcutaneous injection 5,000 Units, Q8H KYRA    insulin lispro (HumALOG/ADMELOG) 100 units/mL subcutaneous injection 1-5 Units, TID AC **AND** Fingerstick Glucose (POCT), TID AC    insulin lispro (HumALOG/ADMELOG) 100 units/mL subcutaneous injection 1-5 Units, HS    insulin lispro (HumALOG/ADMELOG) 100 units/mL subcutaneous injection 1-5 Units, 0200    montelukast (SINGULAIR) tablet 10 mg, HS    OLANZapine (ZyPREXA) tablet 15 mg, HS    Administrative Statements   Today, Patient Was Seen By: Edimla Bonilla PA-C      **Please Note: This note may have been constructed using a voice recognition system.**

## 2024-11-12 NOTE — ASSESSMENT & PLAN NOTE
Weight loss of around 7 lbs in a few weeks   Progressively worsening loss of appetite, abdominal bloating  CT C/A/P unremarkable with no sign of malignancy  Outpt f/u

## 2024-11-12 NOTE — RESTORATIVE TECHNICIAN NOTE
Restorative Technician Note      Patient Name: Tavia Eldridge     Restorative Tech Visit Date: 11/12/24  Note Type: Mobility  Patient Position Upon Consult: Supine  Activity Performed: Ambulated  Assistive Device: Roller walker (Ax1 + chair follow)  Patient Position at End of Consult: Bedside chair; All needs within reach; Bed/Chair alarm activated    SAMMY Warner

## 2024-11-12 NOTE — ASSESSMENT & PLAN NOTE
Patient has known history of CAD was admitted for NSTEMI at [facility] in 2022 and ultimately underwent cardiac catheterization showing 50% stenosis of the proximal RCA, this lesion was not intervened on.  She is maintained on atorvastatin 40 mg daily    Continue aspirin 81 daily  Continue atorvastatin 40 mg daily

## 2024-11-12 NOTE — CASE MANAGEMENT
Case Management Discharge Planning Note    Patient name Tavia Eldridge  Location [facility]-01 MRN [id number]  : 1945 Date 2024       Current Admission Date: 11/10/2024  Current Admission Diagnosis:Stroke-like symptoms   Patient Active Problem List    Diagnosis Date Noted Date Diagnosed    Stroke-like symptoms 11/10/2024     Type 2 diabetes mellitus, with long-term current use of insulin (HCC) 11/10/2024     Hyperlipidemia 11/10/2024     Chest pain 11/10/2024     Anemia 11/10/2024     Hypoglycemia 11/10/2024     Depression 11/10/2024     Weight loss/loss of appetite 11/10/2024     History of subdural hematoma 2023     Paroxysmal SVT (supraventricular tachycardia) ([facility]) 2023     Acid reflux 2023     Asthma 2023     Coronary artery disease involving native coronary artery of native heart with angina pectoris ([facility]) 2022     Primary hypertension 2022       LOS (days): 2  Geometric Mean LOS (GMLOS) (days): 2  Days to GMLOS:-0.1     OBJECTIVE:  Risk of Unplanned Readmission Score: 10.36         Current admission status: Inpatient   [facility] Pharm[facility] [address] [place] [address]  Phone: [phone] Fax: [phone]    Primary Care Provider: Terrie Aguero MD    Primary Insurance: [employer] [facility] REP  Secondary Insurance:     DISCHARGE DETAILS:    Discharge planning discussed with:: Patient and Kane (spouse) via TC  Freedom of Choice: Yes  Comments - Freedom of Choice: Discussed FOC  CM contacted family/caregiver?: Yes (Spouse via TC)  Were Treatment Team discharge recommendations reviewed with patient/caregiver?: Yes    Other Referral/Resources/Interventions Provided:  Interventions: SNF, Short Term Rehab  Referral Comments: This CM discussed therapy recs with pt. Therapy recs STR, pt and spouse in agreement with recs. Pt and spouse agreeable to blanket referral for STR. Referral  entered in aidin awaiting response.      Treatment Team Recommendation: Short Term Rehab, SNF  Discharge Destination Plan:: Short Term Rehab, SNF

## 2024-11-12 NOTE — PLAN OF CARE
Problem: Prexisting or High Potential for Compromised Skin Integrity  Goal: Skin integrity is maintained or improved  Description: INTERVENTIONS:  - Identify patients at risk for skin breakdown  - Assess and monitor skin integrity  - Assess and monitor nutrition and hydration status  - Monitor labs   - Assess for incontinence   - Turn and reposition patient  - Assist with mobility/ambulation  - Relieve pressure over bony prominences  - Avoid friction and shearing  - Provide appropriate hygiene as needed including keeping skin clean and dry  - Evaluate need for skin moisturizer/barrier cream  - Collaborate with interdisciplinary team   - Patient/family teaching  - Consider wound care consult   Outcome: Progressing     Problem: PAIN - ADULT  Goal: Verbalizes/displays adequate comfort level or baseline comfort level  Description: Interventions:  - Encourage patient to monitor pain and request assistance  - Assess pain using appropriate pain scale  - Administer analgesics based on type and severity of pain and evaluate response  - Implement non-pharmacological measures as appropriate and evaluate response  - Consider cultural and social influences on pain and pain management  - Notify physician/advanced practitioner if interventions unsuccessful or patient reports new pain  Outcome: Progressing     Problem: INFECTION - ADULT  Goal: Absence or prevention of progression during hospitalization  Description: INTERVENTIONS:  - Assess and monitor for signs and symptoms of infection  - Monitor lab/diagnostic results  - Monitor all insertion sites, i.e. indwelling lines, tubes, and drains  - Monitor endotracheal if appropriate and nasal secretions for changes in amount and color  - [place] appropriate cooling/warming therapies per order  - Administer medications as ordered  - Instruct and encourage patient and family to use good hand hygiene technique  - Identify and instruct in appropriate isolation precautions for  identified infection/condition  Outcome: Progressing     Problem: DISCHARGE PLANNING  Goal: Discharge to home or other facility with appropriate resources  Description: INTERVENTIONS:  - Identify barriers to discharge w/patient and caregiver  - Arrange for needed discharge resources and transportation as appropriate  - Identify discharge learning needs (meds, wound care, etc.)  - Arrange for interpretive services to assist at discharge as needed  - Refer to Case Management Department for coordinating discharge planning if the patient needs post-hospital services based on physician/advanced practitioner order or complex needs related to functional status, cognitive ability, or social support system  Outcome: Progressing     Problem: Knowledge Deficit  Goal: Patient/family/caregiver demonstrates understanding of disease process, treatment plan, medications, and discharge instructions  Description: Complete learning assessment and assess knowledge base.  Interventions:  - Provide teaching at level of understanding  - Provide teaching via preferred learning methods  Outcome: Progressing     Problem: NEUROSENSORY - ADULT  Goal: Achieves stable or improved neurological status  Description: INTERVENTIONS  - Monitor and report changes in neurological status  - Monitor vital signs such as temperature, blood pressure, glucose, and any other labs ordered   - Initiate measures to prevent increased intracranial pressure  - Monitor for seizure activity and implement precautions if appropriate      Outcome: Progressing  Goal: Achieves maximal functionality and self care  Description: INTERVENTIONS  - Monitor swallowing and airway patency with patient fatigue and changes in neurological status  - Encourage and assist patient to increase activity and self care.   - Encourage visually impaired, hearing impaired and aphasic patients to use assistive/communication devices  Outcome: Progressing     Problem: GASTROINTESTINAL -  ADULT  Goal: Maintains adequate nutritional intake  Description: INTERVENTIONS:  - Monitor percentage of each meal consumed  - Identify factors contributing to decreased intake, treat as appropriate  - Assist with meals as needed  - Monitor I&O, weight, and lab values if indicated  - Obtain nutrition services referral as needed  Outcome: Progressing     Problem: METABOLIC, FLUID AND ELECTROLYTES - ADULT  Goal: Electrolytes maintained within normal limits  Description: INTERVENTIONS:  - Monitor labs and assess patient for signs and symptoms of electrolyte imbalances  - Administer electrolyte replacement as ordered  - Monitor response to electrolyte replacements, including repeat lab results as appropriate  - Instruct patient on fluid and nutrition as appropriate  Outcome: Progressing  Goal: Fluid balance maintained  Description: INTERVENTIONS:  - Monitor labs   - Monitor I/O and WT  - Instruct patient on fluid and nutrition as appropriate  - Assess for signs & symptoms of volume excess or deficit  Outcome: Progressing  Goal: Glucose maintained within target range  Description: INTERVENTIONS:  - Monitor Blood Glucose as ordered  - Assess for signs and symptoms of hyperglycemia and hypoglycemia  - Administer ordered medications to maintain glucose within target range  - Assess nutritional intake and initiate nutrition service referral as needed  Outcome: Progressing

## 2024-11-12 NOTE — ASSESSMENT & PLAN NOTE
Hypoglycemic on arrival and etiology of AMS as above.   With hx of T2DM on Lantus 19 units daily and Prandin outpt   A1C is 5.9. Pt has had poor appetite x months now.  Advise to stop Lantus and  Prandin on discharge and to only monitor with diet for now  Resolved

## 2024-11-12 NOTE — ASSESSMENT & PLAN NOTE
H/o SDH many years ago - s/p right frontotemporal craniotomy  CT head obtained on admission with no acute findings

## 2024-11-12 NOTE — ASSESSMENT & PLAN NOTE
Presented with R sided weakness, confusion, speech difficulty   Noted to be hypoglycemic with glucose in 30s.   CT head with no acute findings   CTA head and neck with moderate stenoses of the origins of both vertebral arteries, moderate stenosis of the left subclavian artery origin approximately 50%  Seen by neurology given concern for stroke like sx with R sided weakness   MRI brain negative, no acute findings   Likely all in the setting of hypoglycemia.  Sx have now resolved.  See below plans.   PT/OT plan for rehab

## 2024-11-12 NOTE — ASSESSMENT & PLAN NOTE
EKG shows mild inferolateral ST depressions.  Troponins negative x 3.  Likely non-cardiac chest pain.  However, patient does have significant risk factor's with poorly controlled type 2 diabetes,  hyperlipidemia and hypertension.  She also has known CAD of her RCA.  [facility] 2/7/2022 and LVH: 50% RCA stenosis, no significant disease of the other vessels.  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   Plan:   This pain is likely noncardiac and reasonable as musculoskeletal pain no further workup at this time.  Can obtain pharmacologic nuclear stress test as an outpatient if pain recurs  Continue aspirin and statin  Continue on telemetry

## 2024-11-12 NOTE — ASSESSMENT & PLAN NOTE
Lab Results   Component Value Date    HGBA1C 5.9 (H) 11/11/2024       Recent Labs     11/12/24  0023 11/12/24  0211 11/12/24  0806 11/12/24  1200   POCGLU 158* 151* 103 169*       Blood Sugar Average: Last 72 hrs:  (P) [id number]  With hypoglycemia as above  Home regimen: Lantus 19 hs, Repaglinide 1 mg TID  Updated A1c 5.9, pt reports to poor appetite.  Recommend to stop/hold Lantus and Prandin at discharge, and monitor with diet only.  Should f/u with PCP for further long term mgmt.  Pt/ agreeable.   SSI, accu checks while IP.

## 2024-11-12 NOTE — ASSESSMENT & PLAN NOTE
Home regimen: Lisinopril 10 mg daily, currently held with concern initially for possible stroke, will continue to hold with normotension

## 2024-11-12 NOTE — ASSESSMENT & PLAN NOTE
Patient is currently being worked up strokelike symptoms, symptoms improved after glucose administration for low blood sugar on admission  CTA head and neck showed bilateral vertebral artery stenosis  There is also a 50% left subclavian artery stenosis  MRI without intracranial pathology  Neurology following

## 2024-11-12 NOTE — ASSESSMENT & PLAN NOTE
Weight loss of around 7 lbs in a few weeks   Progressively worsening loss of appetite, abdominal bloating  F/u CT C/A/P

## 2024-11-12 NOTE — PROGRESS NOTES
Progress Note - Cardiology   Name: Tavia Eldridge 79 y.o. female I MRN: [id number]  [facility] Date of Admission: 11/10/2024   Date of Service: 11/12/2024 I Hospital Day: 2     Assessment & Plan  Chest pain  EKG shows mild inferolateral ST depressions.  Troponins negative x 3.  Likely non-cardiac chest pain.  However, patient does have significant risk factor's with poorly controlled type 2 diabetes,  hyperlipidemia and hypertension.  She also has known CAD of her RCA.  [facility] 2/7/2022 and LVH: 50% RCA stenosis, no significant disease of the other vessels.  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   Plan:   This pain is likely noncardiac and reasonable as musculoskeletal pain no further workup at this time.  Can obtain pharmacologic nuclear stress test as an outpatient if pain recurs  Continue aspirin and statin  Continue on telemetry    Coronary artery disease involving native coronary artery of native heart with angina pectoris (HCC)  Patient has known history of CAD was admitted for NSTEMI at [facility] in 2022 and ultimately underwent cardiac catheterization showing 50% stenosis of the proximal RCA, this lesion was not intervened on.  She is maintained on atorvastatin 40 mg daily    Continue aspirin 81 daily  Continue atorvastatin 40 mg daily  Stroke-like symptoms  Patient is currently being worked up strokelike symptoms, symptoms improved after glucose administration for low blood sugar on admission  CTA head and neck showed bilateral vertebral artery stenosis  There is also a 50% left subclavian artery stenosis  MRI without intracranial pathology  Neurology following  Type 2 diabetes mellitus, with long-term current use of insulin ([facility])  Patient's most recent A1c:8.7  Current home regimen includes: Insulin  "19 U and Prandin 1 mg TID  Patient's blood glucose at the time of presentation was 39, which could have contributed to patient's presentation    Hyperlipidemia  Lipid panel (June 21, 2023): Cholesterol 275 mg/dL, Triglycerides 194 mg/dL, HDL 44 and LDL of 192  Continue statin  Primary hypertension  On lisinopril 10 mg QD PTA  History of subdural hematoma        Case discussed and reviewed with Dr. Crow who agrees with my assessment and plan.    Thank you for involving us in the care of your patient.      Antoine Palacios DO  Cardiology Fellow   PGY-5        Subjective:   Patient seen and examined.  No significant events overnight. Denies lightheadedness, dizziness, syncope, headache, vision changes, diaphoresis, chest pain, palpitations, shortness of breath, PND, orthopnea, nausea, vomiting, abdominal pain or lower extremity edema.    Hospital Course:   Tavia Eldridge is a 79 y.o. year old female with a history of CAD, type 2 diabetes, hyperlipidemia, hypertension, prior history of NSTEMI (2022), and asthma.  Patient presented with an episode of right-sided weakness and numbness and was admitted with strokelike symptoms.  Cardiology was consulted for intermittent chest pain that she was experiencing.    Vitals: Blood pressure 124/66, pulse 96, temperature 98.1 °F (36.7 °C), resp. rate 16, height 5' 3\" (1.6 m), weight 43.5 kg (96 lb), SpO2 96%., Body mass index is 17.01 kg/m².,   Orthostatic Blood Pressures      Flowsheet Row Most Recent Value   Blood Pressure 124/66 filed at 11/12/2024 0750   Patient Position - Orthostatic VS Lying filed at 11/11/2024 0200              Intake/Output Summary (Last 24 hours) at 11/12/2024 1156  Last data filed at 11/12/2024 0548  Gross per 24 hour   Intake 470 ml   Output --   Net 470 ml       Review of System:  Review of system was conducted and was negative except for as stated in the subjective course.    Physical Exam:    GEN: Tavia Eldridge appears well, alert and oriented x 3, " pleasant and cooperative   HEENT:  Normocephalic, atraumatic, anicteric, moist mucous membranes  NECK: No JVD or carotid bruits   HEART: regular rhythm, regular rate, normal S1 and S2, mild systolic murmur present, no clicks, gallops or rubs   LUNGS: Clear to auscultation bilaterally; no wheezes, rales, or rhonchi; respiration nonlabored   ABDOMEN:  Normoactive bowel sounds, soft, no tenderness, no distention  EXTREMITIES: peripheral pulses palpable; no edema  NEURO: no gross focal findings; cranial nerves grossly intact   SKIN:  Dry, intact, warm to touch      Current Facility-Administered Medications:     acetaminophen (TYLENOL) tablet 650 mg, 650 mg, Oral, Q4H PRN, Aubrie Stroud MD    aspirin chewable tablet 81 mg, 81 mg, Oral, Daily, Aubrie Stroud MD, 81 mg at 11/12/24 0822    atorvastatin (LIPITOR) tablet 40 mg, 40 mg, Oral, QPM, Aubrie Stroud MD, 40 mg at 11/11/24 1726    buPROPion (WELLBUTRIN XL) 24 hr tablet 150 mg, 150 mg, Oral, Daily, Aubrie Stroud MD, 150 mg at 11/12/24 0822    cyanocobalamin injection 1,000 mcg, 1,000 mcg, Intramuscular, Q30 Days, Donta oByd DO, 1,000 mcg at 11/11/24 1726    heparin (porcine) subcutaneous injection 5,000 Units, 5,000 Units, Subcutaneous, Q8H KYRA, Aubrie Stroud MD, 5,000 Units at 11/12/24 0509    insulin lispro (HumALOG/ADMELOG) 100 units/mL subcutaneous injection 1-5 Units, 1-5 Units, Subcutaneous, TID AC **AND** Fingerstick Glucose (POCT), , , TID AC, ROSE Krishnan    insulin lispro (HumALOG/ADMELOG) 100 units/mL subcutaneous injection 1-5 Units, 1-5 Units, Subcutaneous, HS, ROSE Krishnan    insulin lispro (HumALOG/ADMELOG) 100 units/mL subcutaneous injection 1-5 Units, 1-5 Units, Subcutaneous, 0200, ROSE Krishnan, 1 Units at 11/12/24 0214    montelukast (SINGULAIR) tablet 10 mg, 10 mg, Oral, HS, Aubrie Stroud MD, 10 mg at 11/11/24 2211    OLANZapine (ZyPREXA) tablet 15 mg, 15 mg, Oral, HS, Aubrie Stroud MD, 15  mg at 11/11/24 2211    Labs & Results:  Results from last 7 days   Lab Units 11/10/24  1317 11/10/24  1109 11/10/24  0903   HS TNI 0HR ng/L  --   --  11   HS TNI 2HR ng/L  --  23  --    HS TNI 4HR ng/L 24  --   --          Results from last 7 days   Lab Units 11/12/24  0532 11/11/24  0646 11/10/24  0903   POTASSIUM mmol/L 4.0 4.0 4.4   CO2 mmol/L 26 25 25   CHLORIDE mmol/L 111* 111* 103   BUN mg/dL 6 10 17   CREATININE mg/dL 0.83 0.95 0.89     Results from last 7 days   Lab Units 11/12/24  0532 11/11/24  0646 11/10/24  0903   HEMOGLOBIN g/dL 11.0* 10.8* 10.0*   HEMATOCRIT % 34.3* 34.3* 30.8*   PLATELETS Thousands/uL 345 322 325     Results from last 7 days   Lab Units 11/11/24  0646   TRIGLYCERIDES mg/dL 123   HDL mg/dL 33*   LDL CALC mg/dL 80   HEMOGLOBIN A1C % 5.9*       Telemetry:   Personally reviewed by Antonie Palacios, DO: normal sinus rhythm      ** Please Note: Fluency DirectDictation voice to text software may have been used in the creation of this document. **

## 2024-11-13 LAB
GLUCOSE SERPL-MCNC: 135 MG/DL (ref 65–140)
GLUCOSE SERPL-MCNC: 272 MG/DL (ref 65–140)
GLUCOSE SERPL-MCNC: 282 MG/DL (ref 65–140)
GLUCOSE SERPL-MCNC: 373 MG/DL (ref 65–140)
GLUCOSE SERPL-MCNC: 385 MG/DL (ref 65–140)
GLUCOSE SERPL-MCNC: 82 MG/DL (ref 65–140)
GLUCOSE SERPL-MCNC: 93 MG/DL (ref 65–140)

## 2024-11-13 PROCEDURE — 97116 GAIT TRAINING THERAPY: CPT

## 2024-11-13 PROCEDURE — 99232 SBSQ HOSP IP/OBS MODERATE 35: CPT | Performed by: PHYSICIAN ASSISTANT

## 2024-11-13 PROCEDURE — 97530 THERAPEUTIC ACTIVITIES: CPT

## 2024-11-13 PROCEDURE — 97535 SELF CARE MNGMENT TRAINING: CPT

## 2024-11-13 PROCEDURE — 82948 REAGENT STRIP/BLOOD GLUCOSE: CPT

## 2024-11-13 RX ORDER — ALPRAZOLAM 0.25 MG/1
0.25 TABLET ORAL
Status: DISCONTINUED | OUTPATIENT
Start: 2024-11-13 | End: 2024-11-16 | Stop reason: HOSPADM

## 2024-11-13 RX ORDER — ALPRAZOLAM 0.25 MG/1
0.25 TABLET ORAL ONCE AS NEEDED
Status: COMPLETED | OUTPATIENT
Start: 2024-11-13 | End: 2024-11-13

## 2024-11-13 RX ADMIN — HEPARIN SODIUM 5000 UNITS: 5000 INJECTION INTRAVENOUS; SUBCUTANEOUS at 05:13

## 2024-11-13 RX ADMIN — MONTELUKAST 10 MG: 10 TABLET, FILM COATED ORAL at 22:18

## 2024-11-13 RX ADMIN — SENNOSIDES AND DOCUSATE SODIUM 1 TABLET: 50; 8.6 TABLET ORAL at 22:18

## 2024-11-13 RX ADMIN — HEPARIN SODIUM 5000 UNITS: 5000 INJECTION INTRAVENOUS; SUBCUTANEOUS at 22:19

## 2024-11-13 RX ADMIN — ATORVASTATIN CALCIUM 40 MG: 40 TABLET, FILM COATED ORAL at 17:24

## 2024-11-13 RX ADMIN — ASPIRIN 81 MG CHEWABLE TABLET 81 MG: 81 TABLET CHEWABLE at 09:19

## 2024-11-13 RX ADMIN — ACETAMINOPHEN 650 MG: 325 TABLET, FILM COATED ORAL at 12:55

## 2024-11-13 RX ADMIN — INSULIN LISPRO 4 UNITS: 100 INJECTION, SOLUTION INTRAVENOUS; SUBCUTANEOUS at 22:18

## 2024-11-13 RX ADMIN — HEPARIN SODIUM 5000 UNITS: 5000 INJECTION INTRAVENOUS; SUBCUTANEOUS at 12:56

## 2024-11-13 RX ADMIN — OLANZAPINE 15 MG: 10 TABLET, FILM COATED ORAL at 22:18

## 2024-11-13 RX ADMIN — INSULIN LISPRO 2 UNITS: 100 INJECTION, SOLUTION INTRAVENOUS; SUBCUTANEOUS at 12:59

## 2024-11-13 RX ADMIN — ALPRAZOLAM 0.25 MG: 0.25 TABLET ORAL at 00:51

## 2024-11-13 RX ADMIN — BUPROPION HYDROCHLORIDE 150 MG: 150 TABLET, EXTENDED RELEASE ORAL at 09:19

## 2024-11-13 RX ADMIN — ACETAMINOPHEN 650 MG: 325 TABLET, FILM COATED ORAL at 00:51

## 2024-11-13 NOTE — PLAN OF CARE
Problem: PAIN - ADULT  Goal: Verbalizes/displays adequate comfort level or baseline comfort level  Description: Interventions:  - Encourage patient to monitor pain and request assistance  - Assess pain using appropriate pain scale  - Administer analgesics based on type and severity of pain and evaluate response  - Implement non-pharmacological measures as appropriate and evaluate response  - Consider cultural and social influences on pain and pain management  - Notify physician/advanced practitioner if interventions unsuccessful or patient reports new pain  Outcome: Progressing     Problem: INFECTION - ADULT  Goal: Absence or prevention of progression during hospitalization  Description: INTERVENTIONS:  - Assess and monitor for signs and symptoms of infection  - Monitor lab/diagnostic results  - Monitor all insertion sites, i.e. indwelling lines, tubes, and drains  - Monitor endotracheal if appropriate and nasal secretions for changes in amount and color  - [place] appropriate cooling/warming therapies per order  - Administer medications as ordered  - Instruct and encourage patient and family to use good hand hygiene technique  - Identify and instruct in appropriate isolation precautions for identified infection/condition  Outcome: Progressing     Problem: Nutrition/Hydration-ADULT  Goal: Nutrient/Hydration intake appropriate for improving, restoring or maintaining nutritional needs  Description: Monitor and assess patient's nutrition/hydration status for malnutrition. Collaborate with interdisciplinary team and initiate plan and interventions as ordered.  Monitor patient's weight and dietary intake as ordered or per policy. Utilize nutrition screening tool and intervene as necessary. Determine patient's food preferences and provide high-protein, high-caloric foods as appropriate.     INTERVENTIONS:  - Monitor oral intake, urinary output, labs, and treatment plans  - Assess nutrition and hydration status and  recommend course of action  - Evaluate amount of meals eaten  - Assist patient with eating if necessary   - Allow adequate time for meals  - Recommend/ encourage appropriate diets, oral nutritional supplements, and vitamin/mineral supplements  - Order, calculate, and assess calorie counts as needed  - Recommend, monitor, and adjust tube feedings and TPN/PPN based on assessed needs  - Assess need for intravenous fluids  - Provide specific nutrition/hydration education as appropriate  - Include patient/family/caregiver in decisions related to nutrition  Outcome: Progressing     Problem: METABOLIC, FLUID AND ELECTROLYTES - ADULT  Goal: Electrolytes maintained within normal limits  Description: INTERVENTIONS:  - Monitor labs and assess patient for signs and symptoms of electrolyte imbalances  - Administer electrolyte replacement as ordered  - Monitor response to electrolyte replacements, including repeat lab results as appropriate  - Instruct patient on fluid and nutrition as appropriate  Outcome: Progressing     Problem: Knowledge Deficit  Goal: Patient/family/caregiver demonstrates understanding of disease process, treatment plan, medications, and discharge instructions  Description: Complete learning assessment and assess knowledge base.  Interventions:  - Provide teaching at level of understanding  - Provide teaching via preferred learning methods  Outcome: Progressing     Problem: DISCHARGE PLANNING  Goal: Discharge to home or other facility with appropriate resources  Description: INTERVENTIONS:  - Identify barriers to discharge w/patient and caregiver  - Arrange for needed discharge resources and transportation as appropriate  - Identify discharge learning needs (meds, wound care, etc.)  - Arrange for interpretive services to assist at discharge as needed  - Refer to Case Management Department for coordinating discharge planning if the patient needs post-hospital services based on physician/advanced practitioner  order or complex needs related to functional status, cognitive ability, or social support system  Outcome: Progressing

## 2024-11-13 NOTE — OCCUPATIONAL THERAPY NOTE
Occupational Therapy Progress Note     Patient Name: Tavia Eldridge  Today's Date: 11/13/2024  Problem List  Principal Problem:    Metabolic encephalopathy  Active Problems:    Type 2 diabetes mellitus, with long-term current use of insulin (HCC)    Hyperlipidemia    Coronary artery disease involving native coronary artery of native heart with angina pectoris (HCC)    History of subdural hematoma    Primary hypertension    Paroxysmal SVT (supraventricular tachycardia) (HCC)    Acid reflux    Asthma    Chest pain    Anemia    Hypoglycemia    Depression    Weight loss/loss of appetite    Moderate protein-calorie malnutrition (HCC)         Occupational Therapy Treatment Note     11/13/24 1148   OT Last Visit   OT Visit Date 11/13/24   Note Type   Note Type Treatment for insurance authorization   Pain Assessment   Pain Assessment Tool FLACC   Pain Rating: FLACC (Rest) - Face 0   Pain Rating: FLACC (Rest) - Legs 0   Pain Rating: FLACC (Rest) - Activity 0   Pain Rating: FLACC (Rest) - Cry 0   Pain Rating: FLACC (Rest) - Consolability 0   Score: FLACC (Rest) 0   Pain Rating: FLACC (Activity) - Face 0   Pain Rating: FLACC (Activity) - Legs 0   Pain Rating: FLACC (Activity) - Activity 0   Pain Rating: FLACC (Activity) - Cry 0   Pain Rating: FLACC (Activity) - Consolability 0   Score: FLACC (Activity) 0   Restrictions/Precautions   Weight Bearing Precautions Per Order No   Lifestyle   Autonomy The pt requires assistance with ADLs, IADLs, and is independent with functional mobility over short distances. As previously mentioned, the pt's  reports the pt's autonomy worsening since their daughter passed away.   Reciprocal Relationships Lives with her  who provides assistance with ADLs and IADLs as needed. 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   Intrinsic Gratification Pt's  reports that the pt  "does not have any interests in leisure exploration or participation since their daughter passed away.   ADL   Where Assessed Edge of bed   Grooming Assistance 5  Supervision/Setup   UB Dressing Assistance 4  Minimal Assistance   LB Dressing Assistance 3  Moderate Assistance   LB Dressing Deficit Thread RLE into pants;Thread LLE into pants;Pull up over hips;Fasteners   Bed Mobility   Supine to Sit 4  Minimal assistance   Additional items Increased time required   Transfers   Sit to Stand 4  Minimal assistance   Additional items Assist x 1   Stand to Sit 4  Minimal assistance   Additional items Assist x 1   Subjective   Subjective pt stated \"I feel a little lightheaded\" \"I need to lay down\"  Pt BP checked this session 137/67 was noted   Cognition   Overall Cognitive Status Impaired   Arousal/Participation Alert;Cooperative   Attention Within functional limits   Orientation Level Oriented X4   Memory Within functional limits   Following Commands Follows one step commands with increased time or repetition   Activity Tolerance   Activity Tolerance Patient limited by fatigue   Assessment   Assessment Pt seen for Occupational Therapy session with focus on activity tolerance, bed mob, functional transfers/mob, sitting balance and tolerance and standing tolerance and balance for pt engagement in UB/LB self-care tasks. Pt cleared by RN/ Raji for pt participated in OT session. Pt presented   supine/HOB raised pt awake/alert and agreeable to participate in therapy following pt identifiers confirmed. Pt reported her therapy goal  was to visit her daughter in [place] again. Pt was pleasant and cooperative with all therapy requests.  Pt required assist for bed mob, LB dressing/donning pants and functional transfers 2* pt deconditioning. She was able to tolerate participating in this session well with frequent rest breaks. Therapy time spend to educate pt and pt  on rehab expectations. Pt remains appropriate for II (Moderate " Resources) Pt set up to bedside chair post session, chair alarm activated and all needs within pt reach   Plan   Treatment Interventions ADL retraining   Goal Expiration Date 11/25/24   OT Treatment Day 4   OT Frequency 2-3x/wk   Discharge Recommendation   Rehab Resource Intensity Level, OT II (Moderate Resource Intensity)   AM-PAC Daily Activity Inpatient   Lower Body Dressing 3   Bathing 3   Toileting 3   Upper Body Dressing 3   Grooming 3   Eating 3   Daily Activity Raw Score 18   Daily Activity Standardized Score (Calc for Raw Score >=11) 38.66   AM-PAC Applied Cognition Inpatient   Following a Speech/Presentation 2   Understanding Ordinary Conversation 3   Taking Medications 3   Remembering Where Things Are Placed or Put Away 2   Remembering List of 4-5 Errands 2   Taking Care of Complicated Tasks 2   Applied Cognition Raw Score 14   Applied Cognition Standardized Score 32.02       Jhoana GOFF/MERNA

## 2024-11-13 NOTE — PLAN OF CARE
Problem: PHYSICAL THERAPY ADULT  Goal: Performs mobility at highest level of function for planned discharge setting.  See evaluation for individualized goals.  Description: Treatment/Interventions: LE strengthening/ROM, Functional transfer training, ADL retraining, Therapeutic exercise, Endurance training, Cognitive reorientation, Patient/family training, Equipment eval/education, Bed mobility, Gait training, Compensatory technique education, Spoke to nursing, Spoke to case management, OT, Family          See flowsheet documentation for full assessment, interventions and recommendations.  Outcome: Progressing  Note: Prognosis: Fair  Problem List: Decreased strength, Decreased endurance, Impaired balance, Decreased mobility, Impaired judgement, Decreased safety awareness  Assessment: pt demonstrated improved functional endurance today, performing all transfers & gait tasks with decreased assist compared to last session.  She performs all activities without LOB, but ambulates with short, shuffing strides at slow pace, which does place her at high risk for falls.  She ambulates short distances with use of RW, but fatigues and requires seated rests to recover.  She performs all tasks excessively slowly, but responded well to encouragement & was able to incrase ambulatory distances during final trial, where she demosntrated spontaneous pace incrase during final ~5' prior to sitting down.  Pt demosntrates capacity & willingness to make substantial progress in upcoming sessions, and will benefit from daily mobilty with staff to continue improving tolerance to mobiilty at this time in advance of more aggressive interventions ahead.  PT POC & d/c recommendations remain appropriate.        Rehab Resource Intensity Level, PT: II (Moderate Resource Intensity)    See flowsheet documentation for full assessment.

## 2024-11-13 NOTE — ASSESSMENT & PLAN NOTE
Pt is reporting weight loss of 7 lbs over a period of a few weeks. Also reports worsening of appetite, abdominal bloating. CT C/A/P with no significant findings that would be consistent with concern for malignancy. Advised to follow up outpatient.

## 2024-11-13 NOTE — ASSESSMENT & PLAN NOTE
Will continue to hold home regiment of Lisinopril 10 mg daily due to initial concern of stroke   Pt continues to still be normotensive off of the medication.

## 2024-11-13 NOTE — PROGRESS NOTES
"Progress Note - Hospitalist   Name: Tavia Eldridge 79 y.o. female I MRN: [id number]  [facility] Date of Admission: 11/10/2024   Date of Service: 11/13/2024 I Hospital Day: 3    Assessment & Plan  Metabolic encephalopathy  Presented with R sided weakness, confusion, speech difficulty   Noted to be hypoglycemic with glucose in 30s.   CT head with no acute findings   CTA head and neck with moderate stenoses of the origins of both vertebral arteries, moderate stenosis of the left subclavian artery origin approximately 50%  Seen by neurology given concern for stroke like sx with R sided weakness   MRI brain negative, no acute findings   Likely all in the setting of hypoglycemia.  Sx have now resolved.  See below plans.   PT/OT plan for rehab   Pt notes nighttime \"jitters\" increased anxiety here, restlessness.  Verified with pharmacy her home zyprexa dosing.  Had relief with PRN xanax given, can continue while IP, possibly some hospital delirium   Hypoglycemia  Hypoglycemic on arrival and etiology of AMS as above.   With hx of T2DM on Lantus 19 units daily and Prandin outpt   A1C is 5.9. Pt has had poor appetite x months now.  Advise to stop Lantus and  Prandin on discharge and to only monitor with diet for now  Resolved   Type 2 diabetes mellitus, with long-term current use of insulin ([facility])  Lab Results   Component Value Date    HGBA1C 5.9 (H) 11/11/2024       Recent Labs     11/12/24  2106 11/13/24  0212 11/13/24  0758 11/13/24  1158   POCGLU 260* 135 82 282*       Blood Sugar Average: Last 72 hrs:  (P) 161.3100732367284226  With hypoglycemia as above  Home regimen: Lantus 19 hs, Repaglinide 1 mg TID  Updated A1c 5.9, pt reports to poor appetite.  Recommend to stop/hold Lantus and Prandin at discharge, and monitor with diet only.  Should f/u with PCP for further long term mgmt.  Pt/ agreeable.   SSI, accu checks while IP.   Chest pain  Complains of retrosternal chest pressure with shortness of breath " and relieved with rest with occasional episodes at rest since a few months  H/o CAD - NSTEMI in 2022, cath with 50% stenosis of proximal RCA  Troponin negative  Cardiology following, no further IP work up, can f/u outpt   Unclear why she is not on BB but BP is on lower side, would hold on starting this for now, defer to outpt providers   Coronary artery disease involving native coronary artery of native heart with angina pectoris (HCC)  As above  Asthma  No exacerbation  Ct home Singulair  Albuterol prn  Primary hypertension  Home regimen: Lisinopril 10 mg daily, currently held with concern initially for possible stroke, will continue to hold with normotension   Weight loss/loss of appetite  Weight loss of around 7 lbs in a few weeks   Progressively worsening loss of appetite, abdominal bloating  CT C/A/P unremarkable with no sign of malignancy  Outpt f/u   Hyperlipidemia  Ct Atorvastatin 40 mg daily  Anemia  Drop in Hb to 10 from baseline of 12/13 in 2023   Iron panel, B12, folate unremarkable  Outpt work up/follow up.  Her hgb has been stable since admission.   Depression  Ct home meds Bupropion  mg daily, Olanzapine 15 mg daily  Paroxysmal SVT (supraventricular tachycardia) (HCC)  H/o paroxysmal SVT  History of subdural hematoma  H/o SDH many years ago - s/p right frontotemporal craniotomy  CT head obtained on admission with no acute findings   Moderate protein-calorie malnutrition (HCC)  Malnutrition Findings:   Adult Malnutrition type: Chronic illness  Adult Degree of Malnutrition: Malnutrition of moderate degree  Malnutrition Characteristics: Muscle loss, Fat loss, Inadequate energy                  360 Statement: Chronic moderate pro, bri malnutrition d/t condition, decreased appetite as evidence by <75% energy intake >  1month, mild muscle and fat loss at temples, orbitals, triceps, interroseous of hands, BMI 17, treated with oral diet, Ensure compact BID, discussed tips of increasing pro, bri intake  "at home. If appetite doesn't improve may need to consider appetite stimulant.    BMI Findings:  Adult BMI Classifications: Underweight < 18.5        Body mass index is 17.01 kg/m².   Of note -- she had restriction of no chocolate.  She prefers chocolate shakes, likely this was placed in case of plan for IP stress test which can be done outpt.  Will lift this restriction and ordered chocolate instead of vanilla Ensure     VTE Pharmacologic Prophylaxis: VTE Score: 3 Moderate Risk (Score 3-4) - Pharmacological DVT Prophylaxis Ordered: heparin.    Mobility:   Basic Mobility Inpatient Raw Score: 13  JH-HLM Goal: 4: Move to chair/commode  JH-HLM Achieved: 6: Walk 10 steps or more  JH-HLM Goal achieved. Continue to encourage appropriate mobility.    Patient Centered Rounds: I performed bedside rounds with nursing staff today.   Discussions with Specialists or Other Care Team Provider: CM    Education and Discussions with Family / Patient: Updated  () at bedside.    Current Length of Stay: 3 day(s)  Current Patient Status: Inpatient   Certification Statement: The patient will continue to require additional inpatient hospital stay due to pending safe discharge to rehab facility  Discharge Plan: Anticipate discharge in 24-48 hrs to rehab facility.    Code Status: Level 1 - Full Code    Subjective   Reports overnight she felt \"jittery/restless\" this is now resolved during the day time, but has happened last 3 nights.     Objective :  Temp:  [97.6 °F (36.4 °C)-98.3 °F (36.8 °C)] 98.3 °F (36.8 °C)  HR:  [81-95] 92  BP: (105-157)/(53-70) 157/70  Resp:  [12-17] 17  SpO2:  [94 %-98 %] 97 %  O2 Device: None (Room air)    Body mass index is 17.01 kg/m².     Input and Output Summary (last 24 hours):     Intake/Output Summary (Last 24 hours) at 11/13/2024 1228  Last data filed at 11/13/2024 1223  Gross per 24 hour   Intake 1140 ml   Output --   Net 1140 ml       Physical Exam  Vitals reviewed.   Constitutional:     "   General: She is not in acute distress.     Appearance: She is not toxic-appearing.      Comments: frail   HENT:      Head: Normocephalic and atraumatic.   Eyes:      Extraocular Movements: Extraocular movements intact.   Pulmonary:      Effort: Pulmonary effort is normal. No respiratory distress.   Musculoskeletal:         General: Normal range of motion.   Neurological:      General: No focal deficit present.      Mental Status: She is alert.   Psychiatric:         Mood and Affect: Mood normal.         Lines/Drains:              Lab Results: I have reviewed the following results:   Results from last 7 days   Lab Units 11/12/24  0532   WBC Thousand/uL 7.20   HEMOGLOBIN g/dL 11.0*   HEMATOCRIT % 34.3*   PLATELETS Thousands/uL 345   SEGS PCT % 59   LYMPHO PCT % 29   MONO PCT % 10   EOS PCT % 1     Results from last 7 days   Lab Units 11/12/24  0532 11/11/24  0646   SODIUM mmol/L 144 143   POTASSIUM mmol/L 4.0 4.0   CHLORIDE mmol/L 111* 111*   CO2 mmol/L 26 25   BUN mg/dL 6 10   CREATININE mg/dL 0.83 0.95   ANION GAP mmol/L 7 7   CALCIUM mg/dL 8.5 8.5   ALBUMIN g/dL  --  3.6   TOTAL BILIRUBIN mg/dL  --  0.31   ALK PHOS U/L  --  50   ALT U/L  --  14   AST U/L  --  21   GLUCOSE RANDOM mg/dL 88 169*     Results from last 7 days   Lab Units 11/10/24  0903   INR  1.08     Results from last 7 days   Lab Units 11/13/24  1158 11/13/24  0758 11/13/24  0212 11/12/24  2106 11/12/24  1650 11/12/24  1200 11/12/24  0806 11/12/24  0211 11/12/24  0023 11/11/24  2208 11/11/24  1954 11/11/24  1636   POC GLUCOSE mg/dl 282* 82 135 260* 130 169* 103 151* 158* 149* 218* 131     Results from last 7 days   Lab Units 11/11/24  0646   HEMOGLOBIN A1C % 5.9*           Recent Cultures (last 7 days):         Imaging Results Review: No pertinent imaging studies reviewed.  Other Study Results Review: No additional pertinent studies reviewed.    Last 24 Hours Medication List:     Current Facility-Administered Medications:     acetaminophen (TYLENOL)  tablet 650 mg, Q4H PRN    aspirin chewable tablet 81 mg, Daily    atorvastatin (LIPITOR) tablet 40 mg, QPM    buPROPion (WELLBUTRIN XL) 24 hr tablet 150 mg, Daily    cyanocobalamin injection 1,000 mcg, Q30 Days    heparin (porcine) subcutaneous injection 5,000 Units, Q8H KYRA    insulin lispro (HumALOG/ADMELOG) 100 units/mL subcutaneous injection 1-5 Units, TID AC **AND** Fingerstick Glucose (POCT), TID AC    insulin lispro (HumALOG/ADMELOG) 100 units/mL subcutaneous injection 1-5 Units, HS    insulin lispro (HumALOG/ADMELOG) 100 units/mL subcutaneous injection 1-5 Units, 0200    montelukast (SINGULAIR) tablet 10 mg, HS    OLANZapine (ZyPREXA) tablet 15 mg, HS    polyethylene glycol (MIRALAX) packet 17 g, Daily PRN    senna-docusate sodium (SENOKOT S) 8.6-50 mg per tablet 1 tablet, HS    Administrative Statements   Today, Patient Was Seen By: Edilma Bonilla PA-C      **Please Note: This note may have been constructed using a voice recognition system.**

## 2024-11-13 NOTE — ASSESSMENT & PLAN NOTE
Malnutrition Findings:   Adult Malnutrition type: Chronic illness  Adult Degree of Malnutrition: Malnutrition of moderate degree  Malnutrition Characteristics: Muscle loss, Fat loss, Inadequate energy                  360 Statement: Chronic moderate pro, bri malnutrition d/t condition, decreased appetite as evidence by <75% energy intake >  1month, mild muscle and fat loss at temples, orbitals, triceps, interroseous of hands, BMI 17, treated with oral diet, Ensure compact BID, discussed tips of increasing pro, bri intake at home. If appetite doesn't improve may need to consider appetite stimulant.    BMI Findings:  Adult BMI Classifications: Underweight < 18.5        Body mass index is 17.01 kg/m².   Of note -- she had restriction of no chocolate.  She prefers chocolate shakes, likely this was placed in case of plan for IP stress test which can be done outpt.  Will lift this restriction and ordered chocolate instead of vanilla Ensure

## 2024-11-13 NOTE — ASSESSMENT & PLAN NOTE
Lab Results   Component Value Date    HGBA1C 5.9 (H) 11/11/2024       Recent Labs     11/12/24  2106 11/13/24  0212 11/13/24  0758 11/13/24  1158   POCGLU 260* 135 82 282*       Blood Sugar Average: Last 72 hrs:  (P) [id number]  With hypoglycemia as above  Home regimen: Lantus 19 hs, Repaglinide 1 mg TID  Updated A1c 5.9, pt reports to poor appetite.  Recommend to stop/hold Lantus and Prandin at discharge, and monitor with diet only.  Should f/u with PCP for further long term mgmt.  Pt/ agreeable.   SSI, accu checks while IP.

## 2024-11-13 NOTE — PLAN OF CARE
Problem: OCCUPATIONAL THERAPY ADULT  Goal: Performs self-care activities at highest level of function for planned discharge setting.  See evaluation for individualized goals.  Description: Treatment Interventions: ADL retraining, Functional transfer training, UE strengthening/ROM, Endurance training, Cognitive reorientation, Patient/family training, Equipment evaluation/education, Compensatory technique education, Energy conservation, Activityengagement          See flowsheet documentation for full assessment, interventions and recommendations.   Outcome: Progressing  Note: Limitation: Decreased ADL status, Decreased UE strength, Decreased cognition, Decreased endurance, Decreased self-care trans, Decreased high-level ADLs  Prognosis: Fair  Assessment: Pt seen for Occupational Therapy session with focus on activity tolerance, bed mob, functional transfers/mob, sitting balance and tolerance and standing tolerance and balance for pt engagement in UB/LB self-care tasks. Pt cleared by MINA/ Raji for pt participated in OT session. Pt presented   supine/HOB raised pt awake/alert and agreeable to participate in therapy following pt identifiers confirmed. Pt reported her therapy goal  was to visit her daughter in [place] again. Pt was pleasant and cooperative with all therapy requests.  Pt required assist for bed mob, LB dressing/donning pants and functional transfers 2* pt deconditioning. She was able to tolerate participating in this session well with frequent rest breaks. Therapy time spend to educate pt and pt  on rehab expectations. Pt remains appropriate for II (Moderate Resources) Pt set up to bedside chair post session, chair alarm activated and all needs within pt reach     Rehab Resource Intensity Level, OT: II (Moderate Resource Intensity)

## 2024-11-13 NOTE — ASSESSMENT & PLAN NOTE
As listed above, pt on initial presentation of AMS most likely due to hypoglycemia. Pt has hx of T2DM and was on Lantus 19 units daily and Prandin outpatient.   Most current A1C is 5.9 and pt has been reporting poor appetite in the last few months. Advised the pt to stop taking these two medications upon discharge and to only monitor with diet.

## 2024-11-13 NOTE — ASSESSMENT & PLAN NOTE
Lab Results   Component Value Date    HGBA1C 5.9 (H) 11/11/2024       Recent Labs     11/12/24  1650 11/12/24  2106 11/13/24  0212 11/13/24  0758   POCGLU 130 260* 135 82       Blood Sugar Average: Last 72 hrs:  (P) [id number]  Review plan as above for hypoglycemia.   Home regiment was Lantus 19 units and Prandin 1 mg TID   Most recent A1C 5.9 and pt has been reporting poor diet. Recommended to stop Lantus and Prandin at discharge and to monitor only with diet. Advised to follow up with PCP outpatient for further long term management.   SSI, finger stick glucose while IP.   Hypoglycemia protocol in place.

## 2024-11-13 NOTE — ASSESSMENT & PLAN NOTE
Initial presentation of R sided weakness, confusion, and difficulty with speech and was noted to be hypoglycemic in the 30s.CT Head negative. CTA head and neck with moderate stenoses of the origins of both vertebral arteries, moderate stenosis of the left subclavian artery origin approximately 50%. Pt was seen by neurology with concern for CVA and right sided weakness. MRI brain showed no significant findings. Most likely can be attributed to hypoglycemia. Pt currently has no symptoms at this time. Review plans below.   PT/OT scheduled for rehab.

## 2024-11-13 NOTE — PROGRESS NOTES
Progress Note - Internal Medicine   Name: Tavia Eldridge 79 y.o. female I MRN: [id number]  [facility] I Date of Admission: 11/10/2024   Date of Service: 11/13/2024 I Hospital Day: 3  { ?Quick Links I Problem List I YANI NICHOLS Billing Tip:25133}  Assessment & Plan  Metabolic encephalopathy  Initial presentation of R sided weakness, confusion, and difficulty with speech and was noted to be hypoglycemic in the 30s.CT Head negative. CTA head and neck with moderate stenoses of the origins of both vertebral arteries, moderate stenosis of the left subclavian artery origin approximately 50%. Pt was seen by neurology with concern for CVA and right sided weakness. MRI brain showed no significant findings. Most likely can be attributed to hypoglycemia. Pt currently has no symptoms at this time. Review plans below.   PT/OT scheduled for rehab.   Hypoglycemia  As listed above, pt on initial presentation of AMS most likely due to hypoglycemia. Pt has hx of T2DM and was on Lantus 19 units daily and Prandin outpatient.   Most current A1C is 5.9 and pt has been reporting poor appetite in the last few months. Advised the pt to stop taking these two medications upon discharge and to only monitor with diet.   Type 2 diabetes mellitus, with long-term current use of insulin ([facility])  Lab Results   Component Value Date    HGBA1C 5.9 (H) 11/11/2024       Recent Labs     11/12/24  1650 11/12/24  2106 11/13/24  0212 11/13/24  0758   POCGLU 130 260* 135 82       Blood Sugar Average: Last 72 hrs:  (P) 156.2819598248288921  Review plan as above for hypoglycemia.   Home regiment was Lantus 19 units and Prandin 1 mg TID   Most recent A1C 5.9 and pt has been reporting poor diet. Recommended to stop Lantus and Prandin at discharge and to monitor only with diet. Advised to follow up with PCP outpatient for further long term management.   SSI, finger stick glucose while IP.   Hypoglycemia protocol in place.   Hyperlipidemia  Continue to take  Atorvastatin 40 mg daily.   Coronary artery disease involving native coronary artery of native heart with angina pectoris (HCC)    History of subdural hematoma  Hx of SDH in the past. S/P right frontotemproal craniotomy   CT head admission given AMS with no acute findings found.   Primary hypertension  Will continue to hold home regiment of Lisinopril 10 mg daily due to initial concern of stroke   Pt continues to still be normotensive off of the medication.   Paroxysmal SVT (supraventricular tachycardia) (HCC)  Has hx of PSVT  Acid reflux    Asthma    Chest pain    Anemia    Depression  Continue to take Bupropion  mg daily, Olanzapine 15 mg daily.   Weight loss/loss of appetite  Pt is reporting weight loss of 7 lbs over a period of a few weeks. Also reports worsening of appetite, abdominal bloating. CT C/A/P with no significant findings that would be consistent with concern for malignancy. Advised to follow up outpatient.   Moderate protein-calorie malnutrition (HCC)      Disposition: ***     Team: { IP SOD TEAMS:45607}    Subjective   Patient seen and examined. No acute events overnight. ***    Objective :{?Quick Links I ICU Summary I Vitals I I/Os I LDAs I Mobility (PT/OT) I Code Status / ACP   ?Quick Links I Active Meds I Pain Meds I Antibiotics I Anticoagulants:63401}  Temp:  [97.6 °F (36.4 °C)-98.1 °F (36.7 °C)] 97.6 °F (36.4 °C)  HR:  [] 83  BP: (105-147)/(53-76) 138/67  Resp:  [12-20] 17  SpO2:  [94 %-98 %] 98 %  O2 Device: None (Room air)    I/O         11/11 0701  11/12 0700 11/12 0701  11/13 0700 11/13 0701  11/14 0700    P.O. 710 800     I.V. (mL/kg) 1483.6 (34.1)      IV Piggyback       Total Intake(mL/kg) 2193.6 (50.4) 800 (18.4)     Net +2193.6 +800            Unmeasured Urine Occurrence 3 x 3 x 1 x          Weights:   IBW (Ideal Body Weight): 52.4 kg    Body mass index is 17.01 kg/m².  Weight (last 2 days)       Date/Time Weight    11/11/24 1430 43.5 (96)    11/11/24 0407 43.5 (96)       "      Physical Exam    {?Quick Links I Lab Review I Micro Results I Radiology I Cardiology:08920}  Lab Results: I have reviewed the following results:  Recent Labs     11/10/24  0903 11/10/24  1109 11/11/24  0646 11/12/24  0532   WBC 7.63  --  7.74 7.20   HGB 10.0*  --  10.8* 11.0*   HCT 30.8*  --  34.3* 34.3*     --  322 345   SODIUM 133*  --  143 144   K 4.4  --  4.0 4.0     --  111* 111*   CO2 25  --  25 26   BUN 17  --  10 6   CREATININE 0.89  --  0.95 0.83   GLUC 318*  --  169* 88   MG  --   --  2.3  --    PHOS  --   --  3.4  --    AST  --   --  21  --    ALT  --   --  14  --    ALB  --   --  3.6  --    TBILI  --   --  0.31  --    ALKPHOS  --   --  50  --    PTT 27  --   --   --    INR 1.08  --   --   --    HSTNI0 11  --   --   --    HSTNI2  --  23  --   --        {Imaging Results Review:90448::\"No pertinent imaging studies reviewed.\"}  {Other Study Results Review:77450::\"No additional pertinent studies reviewed.\"}    Currently Ordered Meds:   Current Facility-Administered Medications:     acetaminophen (TYLENOL) tablet 650 mg, Q4H PRN    aspirin chewable tablet 81 mg, Daily    atorvastatin (LIPITOR) tablet 40 mg, QPM    buPROPion (WELLBUTRIN XL) 24 hr tablet 150 mg, Daily    cyanocobalamin injection 1,000 mcg, Q30 Days    heparin (porcine) subcutaneous injection 5,000 Units, Q8H KYRA    insulin lispro (HumALOG/ADMELOG) 100 units/mL subcutaneous injection 1-5 Units, TID AC **AND** Fingerstick Glucose (POCT), TID AC    insulin lispro (HumALOG/ADMELOG) 100 units/mL subcutaneous injection 1-5 Units, HS    insulin lispro (HumALOG/ADMELOG) 100 units/mL subcutaneous injection 1-5 Units, 0200    montelukast (SINGULAIR) tablet 10 mg, HS    OLANZapine (ZyPREXA) tablet 15 mg, HS    polyethylene glycol (MIRALAX) packet 17 g, Daily PRN    senna-docusate sodium (SENOKOT S) 8.6-50 mg per tablet 1 tablet, HS  VTE Pharmacologic Prophylaxis: {Pharmacologic VTE Prophylaxis:963657874}  VTE Mechanical Prophylaxis: " {Mechanical VTE Prophylaxis:38380}    Administrative Statements   {Time Spent (Optional):12556}  Portions of the record may have been created with voice recognition software.

## 2024-11-13 NOTE — ASSESSMENT & PLAN NOTE
"Presented with R sided weakness, confusion, speech difficulty   Noted to be hypoglycemic with glucose in 30s.   CT head with no acute findings   CTA head and neck with moderate stenoses of the origins of both vertebral arteries, moderate stenosis of the left subclavian artery origin approximately 50%  Seen by neurology given concern for stroke like sx with R sided weakness   MRI brain negative, no acute findings   Likely all in the setting of hypoglycemia.  Sx have now resolved.  See below plans.   PT/OT plan for rehab   Pt notes nighttime \"jitters\" increased anxiety here, restlessness.  Verified with pharmacy her home zyprexa dosing.  Had relief with PRN xanax given, can continue while IP, possibly some hospital delirium   "

## 2024-11-13 NOTE — PHYSICAL THERAPY NOTE
Physical Therapy Progress Note     11/13/24 1145   PT Last Visit   PT Visit Date 11/13/24   Note Type   Note Type Treatment for insurance authorization   Pain Assessment   Pain Score No Pain   Subjective   Subjective Pt encountered supine in bed, pleasant and agreeable to treatment. Reports no new complaints while resting.  Does report transient lightheadedness with activity & position change, but recovers during seated rests.  Continues to endorse generalized weakness & fatigue with ambulation.   Bed Mobility   Supine to Sit 4  Minimal assistance   Additional items Assist x 1;Increased time required;HOB elevated   Transfers   Sit to Stand 3  Moderate assistance  (inconsistant min-mod A)   Additional items Assist x 1   Stand to Sit 4  Minimal assistance   Additional items Assist x 1;Armrests;Increased time required   Ambulation/Elevation   Gait pattern Short stride;Foward flexed;Inconsistent chata;Shuffling;Decreased foot clearance;Narrow JUAN;Improper Weight shift   Gait Assistance 4  Minimal assist   Additional items Assist x 1  (+ chair follow)   Assistive Device Rolling walker   Distance 15', 25'   Balance   Static Sitting Fair   Static Standing Fair -   Ambulatory Poor +   Activity Tolerance   Activity Tolerance Patient tolerated treatment well;Patient limited by fatigue   Nurse Made Aware yes   Assessment   Prognosis Fair   Problem List Decreased strength;Decreased endurance;Impaired balance;Decreased mobility;Impaired judgement;Decreased safety awareness   Assessment pt demonstrated improved functional endurance today, performing all transfers & gait tasks with decreased assist compared to last session.  She performs all activities without LOB, but ambulates with short, shuffing strides at slow pace, which does place her at high risk for falls.  She ambulates short distances with use of RW, but fatigues and requires seated rests to recover.  She performs all tasks excessively slowly, but responded well to  encouragement & was able to incrase ambulatory distances during final trial, where she demosntrated spontaneous pace incrase during final ~5' prior to sitting down.  Pt demosntrates capacity & willingness to make substantial progress in upcoming sessions, and will benefit from daily mobilty with staff to continue improving tolerance to mobiilty at this time in advance of more aggressive interventions ahead.  PT POC & d/c recommendations remain appropriate.   Goals   Patient Goals to be able to visit her daughter in [facility] Expiration Date 11/25/24   PT Treatment Day 1   Plan   Treatment/Interventions Functional transfer training;LE strengthening/ROM;Therapeutic exercise;Endurance training;Patient/family training;Equipment eval/education;Bed mobility;Gait training;Elevations   Progress Progressing toward goals   PT Frequency 2-3x/wk   Discharge Recommendation   Rehab Resource Intensity Level, PT II (Moderate Resource Intensity)   AM-PAC Basic Mobility Inpatient   Turning in Flat Bed Without Bedrails 3   Lying on Back to Sitting on Edge of Flat Bed Without Bedrails 3   Moving Bed to Chair 3   Standing Up From Chair Using Arms 2   Walk in Room 3   Climb 3-5 Stairs With Railing 2   Basic Mobility Inpatient Raw Score 16   Basic Mobility Standardized Score 38.32   [facility] Highest Level Of Mobility   -HLM Goal 5: Stand one or more mins   JH-HLM Achieved 7: Walk 25 feet or more       Marck Ordoñez PTA    An [facility] Basic Mobility Raw Score less than 17 suggests pt would benefit from post acute rehab.  Please also refer to the recommendation of the Physical Therapist for safe discharge planning.

## 2024-11-13 NOTE — CASE MANAGEMENT
Case Management Discharge Planning Note    Patient name Tavia Eldridge  Location [facility]-01 MRN [id number]  : 1945 Date 2024       Current Admission Date: 11/10/2024  Current Admission Diagnosis:Metabolic encephalopathy   Patient Active Problem List    Diagnosis Date Noted Date Diagnosed    Moderate protein-calorie malnutrition (HCC) 2024     Metabolic encephalopathy 11/10/2024     Type 2 diabetes mellitus, with long-term current use of insulin (HCC) 11/10/2024     Hyperlipidemia 11/10/2024     Chest pain 11/10/2024     Anemia 11/10/2024     Hypoglycemia 11/10/2024     Depression 11/10/2024     Weight loss/loss of appetite 11/10/2024     History of subdural hematoma 2023     Paroxysmal SVT (supraventricular tachycardia) ([facility] 2023     Acid reflux 2023     Asthma 2023     Coronary artery disease involving native coronary artery of native heart with angina pectoris (HCC) 2022     Primary hypertension 2022       LOS (days): 3  Geometric Mean LOS (GMLOS) (days): 4  Days to GMLOS:0.7     OBJECTIVE:  Risk of Unplanned Readmission Score: 10.82         Current admission status: Inpatient   Preferred Pharmacy:   [facility] [address] [place] [address]  Phone: [phone] Fax: [phone]    Primary Care Provider: Terrie Aguero MD    Primary Insurance: [employer] [facility] REP  Secondary Insurance:     DISCHARGE DETAILS:    Discharge planning discussed with:: patient and Kane (spouse) at bedside  Freedom of Choice: Yes  Comments - Freedom of Choice: Discussed FOC  CM contacted family/caregiver?: Yes  Were Treatment Team discharge recommendations reviewed with patient/caregiver?: Yes  Did patient/caregiver verbalize understanding of patient care needs?: N/A- going to facility  Were patient/caregiver advised of the risks associated with not following Treatment Team discharge recommendations?:  Yes      Other Referral/Resources/Interventions Provided:  Interventions: SNF, Short Term Rehab  Referral Comments: [facility] able to accept and reserved in aidin, awaiting updated OT notes to submit for auth. Pt and spouse are in agreement with dcp.      Treatment Team Recommendation: Short Term Rehab, SNF  Discharge Destination Plan:: Short Term Rehab, SNF

## 2024-11-13 NOTE — ASSESSMENT & PLAN NOTE
Hx of SDH in the past. S/P right frontotemproal craniotomy   CT head admission given AMS with no acute findings found.

## 2024-11-14 ENCOUNTER — APPOINTMENT (INPATIENT)
Dept: RADIOLOGY | Facility: HOSPITAL | Age: 79
DRG: 637 | End: 2024-11-14
Payer: COMMERCIAL

## 2024-11-14 LAB
ALBUMIN SERPL BCG-MCNC: 3.6 G/DL (ref 3.5–5)
ALP SERPL-CCNC: 60 U/L (ref 34–104)
ALT SERPL W P-5'-P-CCNC: 12 U/L (ref 7–52)
ANION GAP SERPL CALCULATED.3IONS-SCNC: 6 MMOL/L (ref 4–13)
AST SERPL W P-5'-P-CCNC: 15 U/L (ref 13–39)
BILIRUB SERPL-MCNC: 0.24 MG/DL (ref 0.2–1)
BUN SERPL-MCNC: 12 MG/DL (ref 5–25)
CALCIUM SERPL-MCNC: 9.7 MG/DL (ref 8.4–10.2)
CHLORIDE SERPL-SCNC: 103 MMOL/L (ref 96–108)
CO2 SERPL-SCNC: 32 MMOL/L (ref 21–32)
CREAT SERPL-MCNC: 1.22 MG/DL (ref 0.6–1.3)
ERYTHROCYTE [DISTWIDTH] IN BLOOD BY AUTOMATED COUNT: 14.5 % (ref 11.6–15.1)
GFR SERPL CREATININE-BSD FRML MDRD: 42 ML/MIN/1.73SQ M
GLUCOSE SERPL-MCNC: 129 MG/DL (ref 65–140)
GLUCOSE SERPL-MCNC: 132 MG/DL (ref 65–140)
GLUCOSE SERPL-MCNC: 139 MG/DL (ref 65–140)
GLUCOSE SERPL-MCNC: 155 MG/DL (ref 65–140)
GLUCOSE SERPL-MCNC: 157 MG/DL (ref 65–140)
GLUCOSE SERPL-MCNC: 225 MG/DL (ref 65–140)
GLUCOSE SERPL-MCNC: 290 MG/DL (ref 65–140)
GLUCOSE SERPL-MCNC: 80 MG/DL (ref 65–140)
GLUCOSE SERPL-MCNC: 90 MG/DL (ref 65–140)
GLUCOSE SERPL-MCNC: 95 MG/DL (ref 65–140)
HCT VFR BLD AUTO: 34.1 % (ref 34.8–46.1)
HGB BLD-MCNC: 11.2 G/DL (ref 11.5–15.4)
MCH RBC QN AUTO: 29 PG (ref 26.8–34.3)
MCHC RBC AUTO-ENTMCNC: 32.8 G/DL (ref 31.4–37.4)
MCV RBC AUTO: 88 FL (ref 82–98)
PLATELET # BLD AUTO: 383 THOUSANDS/UL (ref 149–390)
PMV BLD AUTO: 9.9 FL (ref 8.9–12.7)
POTASSIUM SERPL-SCNC: 4.4 MMOL/L (ref 3.5–5.3)
PROT SERPL-MCNC: 6 G/DL (ref 6.4–8.4)
RBC # BLD AUTO: 3.86 MILLION/UL (ref 3.81–5.12)
SODIUM SERPL-SCNC: 141 MMOL/L (ref 135–147)
WBC # BLD AUTO: 7.55 THOUSAND/UL (ref 4.31–10.16)

## 2024-11-14 PROCEDURE — 82948 REAGENT STRIP/BLOOD GLUCOSE: CPT

## 2024-11-14 PROCEDURE — 99238 HOSP IP/OBS DSCHRG MGMT 30/<: CPT | Performed by: PHYSICIAN ASSISTANT

## 2024-11-14 PROCEDURE — 70450 CT HEAD/BRAIN W/O DYE: CPT

## 2024-11-14 PROCEDURE — RECHECK: Performed by: PHYSICIAN ASSISTANT

## 2024-11-14 PROCEDURE — 80053 COMPREHEN METABOLIC PANEL: CPT | Performed by: PHYSICIAN ASSISTANT

## 2024-11-14 PROCEDURE — 85027 COMPLETE CBC AUTOMATED: CPT | Performed by: PHYSICIAN ASSISTANT

## 2024-11-14 RX ORDER — ONDANSETRON 2 MG/ML
4 INJECTION INTRAMUSCULAR; INTRAVENOUS EVERY 6 HOURS PRN
Status: DISCONTINUED | OUTPATIENT
Start: 2024-11-14 | End: 2024-11-16 | Stop reason: HOSPADM

## 2024-11-14 RX ORDER — POLYETHYLENE GLYCOL 3350 17 G/17G
17 POWDER, FOR SOLUTION ORAL DAILY
Status: DISCONTINUED | OUTPATIENT
Start: 2024-11-14 | End: 2024-11-16 | Stop reason: HOSPADM

## 2024-11-14 RX ORDER — ASPIRIN 81 MG/1
81 TABLET, CHEWABLE ORAL DAILY
Start: 2024-11-15

## 2024-11-14 RX ADMIN — ASPIRIN 81 MG CHEWABLE TABLET 81 MG: 81 TABLET CHEWABLE at 09:18

## 2024-11-14 RX ADMIN — ATORVASTATIN CALCIUM 40 MG: 40 TABLET, FILM COATED ORAL at 17:28

## 2024-11-14 RX ADMIN — OLANZAPINE 15 MG: 10 TABLET, FILM COATED ORAL at 22:22

## 2024-11-14 RX ADMIN — INSULIN LISPRO 2 UNITS: 100 INJECTION, SOLUTION INTRAVENOUS; SUBCUTANEOUS at 12:03

## 2024-11-14 RX ADMIN — ALPRAZOLAM 0.25 MG: 0.25 TABLET ORAL at 03:04

## 2024-11-14 RX ADMIN — SENNOSIDES AND DOCUSATE SODIUM 1 TABLET: 50; 8.6 TABLET ORAL at 22:22

## 2024-11-14 RX ADMIN — INSULIN LISPRO 1 UNITS: 100 INJECTION, SOLUTION INTRAVENOUS; SUBCUTANEOUS at 17:28

## 2024-11-14 RX ADMIN — MONTELUKAST 10 MG: 10 TABLET, FILM COATED ORAL at 22:22

## 2024-11-14 RX ADMIN — HEPARIN SODIUM 5000 UNITS: 5000 INJECTION INTRAVENOUS; SUBCUTANEOUS at 22:22

## 2024-11-14 RX ADMIN — INSULIN LISPRO 2 UNITS: 100 INJECTION, SOLUTION INTRAVENOUS; SUBCUTANEOUS at 22:22

## 2024-11-14 RX ADMIN — BUPROPION HYDROCHLORIDE 150 MG: 150 TABLET, EXTENDED RELEASE ORAL at 09:18

## 2024-11-14 RX ADMIN — HEPARIN SODIUM 5000 UNITS: 5000 INJECTION INTRAVENOUS; SUBCUTANEOUS at 05:36

## 2024-11-14 NOTE — DISCHARGE INSTR - AVS FIRST PAGE
Stop taking lisinopril, lantus, and prandin.  Recommend diet control of blood sugars only.  Follow up with primary care on discharge   Please record you blood sugar in the morning and the blood sugar at night   Take this with you to your family doctor visit

## 2024-11-14 NOTE — ASSESSMENT & PLAN NOTE
Lab Results   Component Value Date    HGBA1C 5.9 (H) 11/11/2024       Recent Labs     11/14/24  0045 11/14/24  0157 11/14/24  0511 11/14/24  0748   POCGLU 139 80 132 129       Blood Sugar Average: Last 72 hrs:  (P) 175.9889506142991913  With hypoglycemia as above  Home regimen: Lantus 19 hs, Repaglinide 1 mg TID  Updated A1c 5.9, pt reports to poor appetite.  Recommend to stop/hold Lantus and Prandin at discharge, and monitor with diet only.  Should f/u with PCP for further long term mgmt.  Pt/ agreeable.   SSI, accu checks while IP.

## 2024-11-14 NOTE — DISCHARGE SUMMARY
Discharge Summary - Hospitalist   Name: Tavia Eldridge 79 y.o. female I MRN: [id number]  Unit/Bed#: [facility] 612-01 I Date of Admission: 11/10/2024   Date of Service: 11/14/2024 I Hospital Day: 4     Assessment & Plan  Metabolic encephalopathy  Presented with R sided weakness, confusion, speech difficulty   Noted to be hypoglycemic with glucose in 30s.   CT head with no acute findings   CTA head and neck with moderate stenoses of the origins of both vertebral arteries, moderate stenosis of the left subclavian artery origin approximately 50%  Seen by neurology given concern for stroke like sx with R sided weakness   MRI brain negative, no acute findings   Likely all in the setting of hypoglycemia.  Sx have now resolved.  See below plans.   PT/OT plan for rehab   Confirmed her home zyprexa dosing is 15 mg QHS   Hypoglycemia  Hypoglycemic on arrival and etiology of AMS as above.   With hx of T2DM on Lantus 19 units daily and Prandin outpt   A1C is 5.9. Pt has had poor appetite x months now.  Advise to stop Lantus and  Prandin on discharge and to only monitor with diet for now  Resolved   Type 2 diabetes mellitus, with long-term current use of insulin ([facility])  Lab Results   Component Value Date    HGBA1C 5.9 (H) 11/11/2024       Recent Labs     11/14/24  0157 11/14/24  0511 11/14/24  0748 11/14/24  1120   POCGLU 80 132 129 225*       Blood Sugar Average: Last 72 hrs:  (P) 177.7492097906038955  With hypoglycemia as above  Home regimen: Lantus 19 hs, Repaglinide 1 mg TID  Updated A1c 5.9, pt reports to poor appetite.  Recommend to stop/hold Lantus and Prandin at discharge, and monitor with diet only.  Should f/u with PCP for further long term mgmt.  Pt/ agreeable.   SSI, accu checks while IP.   Chest pain  Complains of retrosternal chest pressure with shortness of breath and relieved with rest with occasional episodes at rest since a few months  H/o CAD - NSTEMI in 2022, cath with 50% stenosis of proximal RCA  Troponin  negative  Cardiology following, no further IP work up, can f/u outpt   Unclear why she is not on BB but BP is on lower side, would hold on starting this for now, defer to outpt providers   Coronary artery disease involving native coronary artery of native heart with angina pectoris (HCC)  As above  Asthma  No exacerbation  Ct home Singulair  Albuterol prn  Primary hypertension  Home regimen: Lisinopril 10 mg daily, currently held with concern initially for possible stroke, will continue to hold with normotension   Weight loss/loss of appetite  Weight loss of around 7 lbs in a few weeks   Progressively worsening loss of appetite, abdominal bloating  CT C/A/P unremarkable with no sign of malignancy  Outpt f/u   Hyperlipidemia  Ct Atorvastatin 40 mg daily  Anemia  Drop in Hb to 10 from baseline of 12/13 in 2023   Iron panel, B12, folate unremarkable  Outpt work up/follow up.  Her hgb has been stable since admission.   Depression  Ct home meds Bupropion  mg daily, Olanzapine 15 mg daily  Paroxysmal SVT (supraventricular tachycardia) (HCC)  H/o paroxysmal SVT  History of subdural hematoma  H/o SDH many years ago - s/p right frontotemporal craniotomy  CT head obtained on admission with no acute findings   Moderate protein-calorie malnutrition (HCC)  Malnutrition Findings:   Adult Malnutrition type: Chronic illness  Adult Degree of Malnutrition: Malnutrition of moderate degree  Malnutrition Characteristics: Muscle loss, Fat loss, Inadequate energy                  360 Statement: Chronic moderate pro, bri malnutrition d/t condition, decreased appetite as evidence by <75% energy intake >  1month, mild muscle and fat loss at temples, orbitals, triceps, interroseous of hands, BMI 17, treated with oral diet, Ensure compact BID, discussed tips of increasing pro, bri intake at home. If appetite doesn't improve may need to consider appetite stimulant.    BMI Findings:  Adult BMI Classifications: Underweight < 18.5         Body mass index is 17.01 kg/m².   Of note -- she had restriction of no chocolate.  She prefers chocolate shakes, likely this was placed in case of plan for IP stress test which can be done outpt.  Will lift this restriction and ordered chocolate instead of vanilla Ensure      Medical Problems      Discharging Physician / Practitioner: Edilma Bonilla PA-C  PCP: Terrie Aguero MD  Admission Date:   Admission Orders (From admission, onward)       Ordered        11/10/24 0925  INPATIENT ADMISSION  Once                          Discharge Date: 11/14/24    Consultations During Hospital Stay:  Neurology   Cardiology     Procedures Performed:   none    Significant Findings / Test Results:   Hypoglycemia glucose 39 on arrival   CTA head/neck moderate stenosis of origins of both vertebral arteries, moderate stenosis of left subclavian artery origin approx 50%  MRI brain with no acute findings/no stroke   CT c/a/p superior right lower lobe GGO, likely infectious/inflammatory and possibly due to aspiration.  No acute findings in abdomen or pelvis.  No evidence of malignancy     Incidental Findings:   None     Test Results Pending at Discharge (will require follow up):   None     Outpatient Tests Requested:  None    Complications:  None    Reason for Admission: stroke like sx, hypoglycemia     Hospital Course:   Tavia Eldridge is a 79 y.o. female patient who originally presented to the hospital on 11/10/2024 due to stroke like sx with confusion, R sided weakness.  A stroke alert was called, she was seen by neurology but she was also noted to have severe hypoglycemia with glucose in the 30s.  Her deficits did improve with correction of hypoglycemia.  MRI brain negative for acute findings/CVA.  She has been monitored off her Lantus and Prandin, A1C is 5.9.  She reports to poor appetite and weight loss.  Recommend to continue to hold all diabetic meds at d/c and monitor with diet only . CT c/a/p obtained for evaluation of  weight loss/poor appetite and was unremarkable.  She should have further evaluation of this as an outpt.  She is stable for discharge to rehab following PT/OT evaluations     Please see above list of diagnoses and related plan for additional information.     Condition at Discharge: good    Discharge Day Visit / Exam:   * Please refer to separate progress note for these details *      Discharge instructions/Information to patient and family:   See after visit summary for information provided to patient and family.      Provisions for Follow-Up Care:  See after visit summary for information related to follow-up care and any pertinent home health orders.      Mobility at time of Discharge:   Basic Mobility Inpatient Raw Score: 16  JH-HLM Goal: 5: Stand one or more mins  JH-HLM Achieved: 7: Walk 25 feet or more  HLM Goal achieved. Continue to encourage appropriate mobility.     Disposition:   Rehab at [facility]    Planned Readmission: no    Discharge Medications:  See after visit summary for reconciled discharge medications provided to patient and/or family.      Administrative Statements   Discharge Statement:  I have spent a total time of 20 minutes in caring for this patient on the day of the visit/encounter.     **Please Note: This note may have been constructed using a voice recognition system**

## 2024-11-14 NOTE — CASE MANAGEMENT
Case Management Discharge Planning Note    Patient name Tavia Eldridge  Location [facility] 612/[facility] 612-01 MRN [id number]  : 1945 Date 2024       Current Admission Date: 11/10/2024  Current Admission Diagnosis:Metabolic encephalopathy   Patient Active Problem List    Diagnosis Date Noted Date Diagnosed    Moderate protein-calorie malnutrition (HCC) 2024     Metabolic encephalopathy 11/10/2024     Type 2 diabetes mellitus, with long-term current use of insulin (HCC) 11/10/2024     Hyperlipidemia 11/10/2024     Chest pain 11/10/2024     Anemia 11/10/2024     Hypoglycemia 11/10/2024     Depression 11/10/2024     Weight loss/loss of appetite 11/10/2024     History of subdural hematoma 2023     Paroxysmal SVT (supraventricular tachycardia) ([facility]) 2023     Acid reflux 2023     Asthma 2023     Coronary artery disease involving native coronary artery of native heart with angina pectoris ([facility]) 2022     Primary hypertension 2022       LOS (days): 4  Geometric Mean LOS (GMLOS) (days): 4  Days to GMLOS:-0.1     OBJECTIVE:  Risk of Unplanned Readmission Score: 9.92         Current admission status: Inpatient   Preferred Pharmacy:   [facility] [id number] [address]  Phone: [phone] Fax: [phone]    Primary Care Provider: Terrie Ageuro MD    Primary Insurance: [employer]  REP  Secondary Insurance:     DISCHARGE DETAILS:                                                                                                               Facility Insurance Auth Number: [id number]

## 2024-11-14 NOTE — CASE MANAGEMENT
[facility] received request for authorization from Care Manager.  Authorization request submitted for: [facility]  Facility Name:[facility]   NPI:[id number]  Facility MD:Dr. Mclaughlin     NPI: [id number]   Authorization initiated by contacting insurance:  [employer]  Via: H&CC Portal   Clinicals submitted via Portal attachment   Pending Reference #:[id number]     Care Manager notified: Davy Garza    Updates to authorization status will be noted in chart. Please reach out to  for updates on any clinical information.

## 2024-11-14 NOTE — ASSESSMENT & PLAN NOTE
Lab Results   Component Value Date    HGBA1C 5.9 (H) 11/11/2024       Recent Labs     11/14/24  0157 11/14/24  0511 11/14/24  0748 11/14/24  1120   POCGLU 80 132 129 225*       Blood Sugar Average: Last 72 hrs:  (P) [id number]  With hypoglycemia as above  Home regimen: Lantus 19 hs, Repaglinide 1 mg TID  Updated A1c 5.9, pt reports to poor appetite.  Recommend to stop/hold Lantus and Prandin at discharge, and monitor with diet only.  Should f/u with PCP for further long term mgmt.  Pt/ agreeable.   SSI, accu checks while IP.

## 2024-11-14 NOTE — ASSESSMENT & PLAN NOTE
Presented with R sided weakness, confusion, speech difficulty   Noted to be hypoglycemic with glucose in 30s.   CT head with no acute findings   CTA head and neck with moderate stenoses of the origins of both vertebral arteries, moderate stenosis of the left subclavian artery origin approximately 50%  Seen by neurology given concern for stroke like sx with R sided weakness   MRI brain negative, no acute findings   Likely all in the setting of hypoglycemia.  Sx have now resolved.  See below plans.   PT/OT plan for rehab   Confirmed her home zyprexa dosing is 15 mg QHS

## 2024-11-14 NOTE — QUICK NOTE
Pt noted to be more lethargic this afternoon -- she was awake but was noted to be nearly falling over sitting in bed, speech was slowed.  Checked her glucose which was 90, vitals were all normal.  As we were talking she did wake up more/seems back to her baseline.  She c/o feeling some nausea.  She denies any acute headaches.  Will check CBC, CMP, CT head for completeness.  D/w nursing to repeat glucose in 15-30 minutes to ensure this remains stable as she presented with AMS from hypoglycemia.  She did not receive any sedating medications from AM evaluation however note pt has not slept well last 3-4 nights so consideration for this playing a role as well.      D/c cancelled at this time

## 2024-11-14 NOTE — CASE MANAGEMENT
Case Management Discharge Planning Note    Patient name Tavia Eldridge  Location [facility] 612/[facility] 612-01 MRN [id number]  : 1945 Date 2024       Current Admission Date: 11/10/2024  Current Admission Diagnosis:Metabolic encephalopathy   Patient Active Problem List    Diagnosis Date Noted Date Diagnosed    Moderate protein-calorie malnutrition (HCC) 2024     Metabolic encephalopathy 11/10/2024     Type 2 diabetes mellitus, with long-term current use of insulin (HCC) 11/10/2024     Hyperlipidemia 11/10/2024     Chest pain 11/10/2024     Anemia 11/10/2024     Hypoglycemia 11/10/2024     Depression 11/10/2024     Weight loss/loss of appetite 11/10/2024     History of subdural hematoma 2023     Paroxysmal SVT (supraventricular tachycardia) ([facility]) 2023     Acid reflux 2023     Asthma 2023     Coronary artery disease involving native coronary artery of native heart with angina pectoris ([facility]) 2022     Primary hypertension 2022       LOS (days): 4  Geometric Mean LOS (GMLOS) (days): 4  Days to GMLOS:-0.3     OBJECTIVE:  Risk of Unplanned Readmission Score: 9.95         Current admission status: Inpatient   Preferred Pharmacy:   [id number] [address]  Phone: [phone] Fax: [phone]    Primary Care Provider: Terrie Aguero MD    Primary Insurance: [facility] REP  Secondary Insurance:     DISCHARGE DETAILS:    DC canceled at this time due to medical stability. CM will continue to follow.

## 2024-11-14 NOTE — CASE MANAGEMENT
Case Management Discharge Planning Note    Patient name Tavia Eldridge  Location [facility] 612/[facility] 612-01 MRN [id number]  : 1945 Date 2024       Current Admission Date: 11/10/2024  Current Admission Diagnosis:Metabolic encephalopathy   Patient Active Problem List    Diagnosis Date Noted Date Diagnosed    Moderate protein-calorie malnutrition (HCC) 2024     Metabolic encephalopathy 11/10/2024     Type 2 diabetes mellitus, with long-term current use of insulin (HCC) 11/10/2024     Hyperlipidemia 11/10/2024     Chest pain 11/10/2024     Anemia 11/10/2024     Hypoglycemia 11/10/2024     Depression 11/10/2024     Weight loss/loss of appetite 11/10/2024     History of subdural hematoma 2023     Paroxysmal SVT (supraventricular tachycardia) ([facility]) 2023     Acid reflux 2023     Asthma 2023     Coronary artery disease involving native coronary artery of native heart with angina pectoris (HCC) 2022     Primary hypertension 2022       LOS (days): 4  Geometric Mean LOS (GMLOS) (days): 4  Days to GMLOS:-0.2     OBJECTIVE:  Risk of Unplanned Readmission Score: 9.95   Current admission status: Inpatient   Preferred Pharmacy:   [facility] [address] [place] [address]  Phone: [phone] Fax: [phone]    Primary Care Provider: Terrie Aguero MD    Primary Insurance: [employer] [facility] REP  Secondary Insurance:     DISCHARGE DETAILS:    Discharge planning discussed with:: patient and [place] spouse  Freedom of Choice: Yes  Comments - Freedom of Choice: Discussed FOC  CM contacted family/caregiver?: Yes  Were Treatment Team discharge recommendations reviewed with patient/caregiver?: Yes  Did patient/caregiver verbalize understanding of patient care needs?: N/A- going to facility  Were patient/caregiver advised of the risks associated with not following Treatment Team discharge recommendations?: Yes    Other  Referral/Resources/Interventions Provided:  Interventions: Short Term Rehab, SNF  Referral Comments: [facility] able to accept today. Pt and spouse in agreement with dcp    Treatment Team Recommendation: Short Term Rehab, SNF  Discharge Destination Plan:: Short Term Rehab, SNF  Transport at Discharge : Family    IMM Given (Date):: 11/14/24  IMM Given to:: Family  Family notified:: Spouse at bedside, signed on pt behalf. pt in agreement  IMM reviewed with patient, patient agrees with discharge determination.     Accepting Facility Name, City & State : [facility]  Receiving Facility/Agency Phone Number: [phone] [id number]  Facility/Agency Fax Number: [phone]

## 2024-11-14 NOTE — PLAN OF CARE
Problem: PAIN - ADULT  Goal: Verbalizes/displays adequate comfort level or baseline comfort level  Description: Interventions:  - Encourage patient to monitor pain and request assistance  - Assess pain using appropriate pain scale  - Administer analgesics based on type and severity of pain and evaluate response  - Implement non-pharmacological measures as appropriate and evaluate response  - Consider cultural and social influences on pain and pain management  - Notify physician/advanced practitioner if interventions unsuccessful or patient reports new pain  Outcome: Progressing     Problem: INFECTION - ADULT  Goal: Absence or prevention of progression during hospitalization  Description: INTERVENTIONS:  - Assess and monitor for signs and symptoms of infection  - Monitor lab/diagnostic results  - Monitor all insertion sites, i.e. indwelling lines, tubes, and drains  - Monitor endotracheal if appropriate and nasal secretions for changes in amount and color  - [place] appropriate cooling/warming therapies per order  - Administer medications as ordered  - Instruct and encourage patient and family to use good hand hygiene technique  - Identify and instruct in appropriate isolation precautions for identified infection/condition  Outcome: Progressing     Problem: Nutrition/Hydration-ADULT  Goal: Nutrient/Hydration intake appropriate for improving, restoring or maintaining nutritional needs  Description: Monitor and assess patient's nutrition/hydration status for malnutrition. Collaborate with interdisciplinary team and initiate plan and interventions as ordered.  Monitor patient's weight and dietary intake as ordered or per policy. Utilize nutrition screening tool and intervene as necessary. Determine patient's food preferences and provide high-protein, high-caloric foods as appropriate.     INTERVENTIONS:  - Monitor oral intake, urinary output, labs, and treatment plans  - Assess nutrition and hydration status and  recommend course of action  - Evaluate amount of meals eaten  - Assist patient with eating if necessary   - Allow adequate time for meals  - Recommend/ encourage appropriate diets, oral nutritional supplements, and vitamin/mineral supplements  - Order, calculate, and assess calorie counts as needed  - Recommend, monitor, and adjust tube feedings and TPN/PPN based on assessed needs  - Assess need for intravenous fluids  - Provide specific nutrition/hydration education as appropriate  - Include patient/family/caregiver in decisions related to nutrition  Outcome: Progressing     Problem: METABOLIC, FLUID AND ELECTROLYTES - ADULT  Goal: Electrolytes maintained within normal limits  Description: INTERVENTIONS:  - Monitor labs and assess patient for signs and symptoms of electrolyte imbalances  - Administer electrolyte replacement as ordered  - Monitor response to electrolyte replacements, including repeat lab results as appropriate  - Instruct patient on fluid and nutrition as appropriate  Outcome: Progressing     Problem: Knowledge Deficit  Goal: Patient/family/caregiver demonstrates understanding of disease process, treatment plan, medications, and discharge instructions  Description: Complete learning assessment and assess knowledge base.  Interventions:  - Provide teaching at level of understanding  - Provide teaching via preferred learning methods  Outcome: Progressing     Problem: DISCHARGE PLANNING  Goal: Discharge to home or other facility with appropriate resources  Description: INTERVENTIONS:  - Identify barriers to discharge w/patient and caregiver  - Arrange for needed discharge resources and transportation as appropriate  - Identify discharge learning needs (meds, wound care, etc.)  - Arrange for interpretive services to assist at discharge as needed  - Refer to Case Management Department for coordinating discharge planning if the patient needs post-hospital services based on physician/advanced practitioner  order or complex needs related to functional status, cognitive ability, or social support system  Outcome: Progressing

## 2024-11-14 NOTE — PROGRESS NOTES
"Progress Note - Hospitalist   Name: Tavia Eldridge 79 y.o. female I MRN: [id number]  Unit[facility] 612-[facility] I Date of Admission: 11/10/2024   Date of Service: 11/14/2024 I Hospital Day: 4    Assessment & Plan  Metabolic encephalopathy  Presented with R sided weakness, confusion, speech difficulty   Noted to be hypoglycemic with glucose in 30s.   CT head with no acute findings   CTA head and neck with moderate stenoses of the origins of both vertebral arteries, moderate stenosis of the left subclavian artery origin approximately 50%  Seen by neurology given concern for stroke like sx with R sided weakness   MRI brain negative, no acute findings   Likely all in the setting of hypoglycemia.  Sx have now resolved.  See below plans.   PT/OT plan for rehab   Pt notes nighttime \"jitters\" increased anxiety here, restlessness.  Verified with pharmacy her home zyprexa dosing.  Had relief with PRN xanax given, can continue while IP, possibly some hospital delirium   Hypoglycemia  Hypoglycemic on arrival and etiology of AMS as above.   With hx of T2DM on Lantus 19 units daily and Prandin outpt   A1C is 5.9. Pt has had poor appetite x months now.  Advise to stop Lantus and  Prandin on discharge and to only monitor with diet for now  Resolved   Type 2 diabetes mellitus, with long-term current use of insulin ([facility])  Lab Results   Component Value Date    HGBA1C 5.9 (H) 11/11/2024       Recent Labs     11/14/24  0045 11/14/24  0157 11/14/24  0511 11/14/24  0748   POCGLU 139 80 132 129       Blood Sugar Average: Last 72 hrs:  (P) [id number]  With hypoglycemia as above  Home regimen: Lantus 19 hs, Repaglinide 1 mg TID  Updated A1c 5.9, pt reports to poor appetite.  Recommend to stop/hold Lantus and Prandin at discharge, and monitor with diet only.  Should f/u with PCP for further long term mgmt.  Pt/ agreeable.   SSI, accu checks while IP.   Chest pain  Complains of retrosternal chest pressure with shortness of breath " and relieved with rest with occasional episodes at rest since a few months  H/o CAD - NSTEMI in 2022, cath with 50% stenosis of proximal RCA  Troponin negative  Cardiology following, no further IP work up, can f/u outpt   Unclear why she is not on BB but BP is on lower side, would hold on starting this for now, defer to outpt providers   Coronary artery disease involving native coronary artery of native heart with angina pectoris (HCC)  As above  Asthma  No exacerbation  Ct home Singulair  Albuterol prn  Primary hypertension  Home regimen: Lisinopril 10 mg daily, currently held with concern initially for possible stroke, will continue to hold with normotension   Weight loss/loss of appetite  Weight loss of around 7 lbs in a few weeks   Progressively worsening loss of appetite, abdominal bloating  CT C/A/P unremarkable with no sign of malignancy  Outpt f/u   Hyperlipidemia  Ct Atorvastatin 40 mg daily  Anemia  Drop in Hb to 10 from baseline of 12/13 in 2023   Iron panel, B12, folate unremarkable  Outpt work up/follow up.  Her hgb has been stable since admission.   Depression  Ct home meds Bupropion  mg daily, Olanzapine 15 mg daily  Paroxysmal SVT (supraventricular tachycardia) (HCC)  H/o paroxysmal SVT  History of subdural hematoma  H/o SDH many years ago - s/p right frontotemporal craniotomy  CT head obtained on admission with no acute findings   Moderate protein-calorie malnutrition (HCC)  Malnutrition Findings:   Adult Malnutrition type: Chronic illness  Adult Degree of Malnutrition: Malnutrition of moderate degree  Malnutrition Characteristics: Muscle loss, Fat loss, Inadequate energy                  360 Statement: Chronic moderate pro, bri malnutrition d/t condition, decreased appetite as evidence by <75% energy intake >  1month, mild muscle and fat loss at temples, orbitals, triceps, interroseous of hands, BMI 17, treated with oral diet, Ensure compact BID, discussed tips of increasing pro, bri intake  at home. If appetite doesn't improve may need to consider appetite stimulant.    BMI Findings:  Adult BMI Classifications: Underweight < 18.5        Body mass index is 17.01 kg/m².   Of note -- she had restriction of no chocolate.  She prefers chocolate shakes, likely this was placed in case of plan for IP stress test which can be done outpt.  Will lift this restriction and ordered chocolate instead of vanilla Ensure     VTE Pharmacologic Prophylaxis: VTE Score: 3 Moderate Risk (Score 3-4) - Pharmacological DVT Prophylaxis Ordered: heparin.    Mobility:   Basic Mobility Inpatient Raw Score: 16  JH-HLM Goal: 5: Stand one or more mins  JH-HLM Achieved: 7: Walk 25 feet or more  JH-HLM Goal achieved. Continue to encourage appropriate mobility.    Patient Centered Rounds: I performed bedside rounds with nursing staff today.   Discussions with Specialists or Other Care Team Provider: CM    Education and Discussions with Family / Patient: Updated  () at bedside.    Current Length of Stay: 4 day(s)  Current Patient Status: Inpatient   Certification Statement: The patient will continue to require additional inpatient hospital stay due to pending safe dc to rehab  Discharge Plan: Anticipate discharge later today or tomorrow to rehab facility.    Code Status: Level 1 - Full Code    Subjective   No acute complaints.  Had some breakfast this morning.  Had a BM yesterday    Objective :  Temp:  [97.4 °F (36.3 °C)-98.3 °F (36.8 °C)] 98.2 °F (36.8 °C)  HR:  [82-99] 99  BP: (124-157)/(60-70) 133/60  Resp:  [17-18] 17  SpO2:  [96 %-98 %] 96 %  O2 Device: None (Room air)    Body mass index is 17.01 kg/m².     Input and Output Summary (last 24 hours):     Intake/Output Summary (Last 24 hours) at 11/14/2024 1005  Last data filed at 11/14/2024 0927  Gross per 24 hour   Intake 795 ml   Output --   Net 795 ml       Physical Exam  Vitals reviewed.   Constitutional:       General: She is not in acute distress.      Appearance: She is not toxic-appearing.      Comments: Frail, underweight   HENT:      Head: Normocephalic and atraumatic.   Cardiovascular:      Rate and Rhythm: Normal rate and regular rhythm.   Pulmonary:      Effort: Pulmonary effort is normal. No respiratory distress.   Abdominal:      General: Bowel sounds are normal. There is no distension.      Palpations: Abdomen is soft.   Musculoskeletal:         General: Normal range of motion.   Skin:     General: Skin is warm.   Neurological:      General: No focal deficit present.      Mental Status: She is alert and oriented to person, place, and time.   Psychiatric:         Mood and Affect: Mood normal.         Behavior: Behavior normal.         Thought Content: Thought content normal.       Lines/Drains:              Lab Results: I have reviewed the following results:   Results from last 7 days   Lab Units 11/12/24  0532   WBC Thousand/uL 7.20   HEMOGLOBIN g/dL 11.0*   HEMATOCRIT % 34.3*   PLATELETS Thousands/uL 345   SEGS PCT % 59   LYMPHO PCT % 29   MONO PCT % 10   EOS PCT % 1     Results from last 7 days   Lab Units 11/12/24  0532 11/11/24  0646   SODIUM mmol/L 144 143   POTASSIUM mmol/L 4.0 4.0   CHLORIDE mmol/L 111* 111*   CO2 mmol/L 26 25   BUN mg/dL 6 10   CREATININE mg/dL 0.83 0.95   ANION GAP mmol/L 7 7   CALCIUM mg/dL 8.5 8.5   ALBUMIN g/dL  --  3.6   TOTAL BILIRUBIN mg/dL  --  0.31   ALK PHOS U/L  --  50   ALT U/L  --  14   AST U/L  --  21   GLUCOSE RANDOM mg/dL 88 169*     Results from last 7 days   Lab Units 11/10/24  0903   INR  1.08     Results from last 7 days   Lab Units 11/14/24  0748 11/14/24  0511 11/14/24  0157 11/14/24  0045 11/13/24  2217 11/13/24  2215 11/13/24  2001 11/13/24  1612 11/13/24  1158 11/13/24  0758 11/13/24  0212 11/12/24  2106   POC GLUCOSE mg/dl 129 132 80 139 385* 373* 272* 93 282* 82 135 260*     Results from last 7 days   Lab Units 11/11/24  0646   HEMOGLOBIN A1C % 5.9*           Recent Cultures (last 7 days):          Imaging Results Review: No pertinent imaging studies reviewed.  Other Study Results Review: No additional pertinent studies reviewed.    Last 24 Hours Medication List:     Current Facility-Administered Medications:     acetaminophen (TYLENOL) tablet 650 mg, Q4H PRN    ALPRAZolam (XANAX) tablet 0.25 mg, HS PRN    aspirin chewable tablet 81 mg, Daily    atorvastatin (LIPITOR) tablet 40 mg, QPM    buPROPion (WELLBUTRIN XL) 24 hr tablet 150 mg, Daily    cyanocobalamin injection 1,000 mcg, Q30 Days    heparin (porcine) subcutaneous injection 5,000 Units, Q8H KYRA    insulin lispro (HumALOG/ADMELOG) 100 units/mL subcutaneous injection 1-5 Units, TID AC **AND** Fingerstick Glucose (POCT), TID AC    insulin lispro (HumALOG/ADMELOG) 100 units/mL subcutaneous injection 1-5 Units, HS    insulin lispro (HumALOG/ADMELOG) 100 units/mL subcutaneous injection 1-5 Units, 0200    montelukast (SINGULAIR) tablet 10 mg, HS    OLANZapine (ZyPREXA) tablet 15 mg, HS    polyethylene glycol (MIRALAX) packet 17 g, Daily PRN    senna-docusate sodium (SENOKOT S) 8.6-50 mg per tablet 1 tablet, HS    Administrative Statements   Today, Patient Was Seen By: Edilma Bonilla PA-C      **Please Note: This note may have been constructed using a voice recognition system.**

## 2024-11-14 NOTE — CASE MANAGEMENT
[facility] has received APPROVED authorization.  Insurance:   AAR  Auth obtained via portal.  Authorization received for: SNF  Facility: [facility]   Authorization #:[id number]  Start of Care:11/14  Next Review Date:11/18  Continued Stay Care Coordinator:  none given   Submit next review to: F: [phone]     Care Manager notified:Davy Garza    Please reach out to  for updates on any clinical information.

## 2024-11-14 NOTE — CASE MANAGEMENT
Case Management Discharge Planning Note    Patient name Tavia Eldridge  Location [facility] 612/[facility] 612-01 MRN [id number]  : 1945 Date 2024       Current Admission Date: 11/10/2024  Current Admission Diagnosis:Metabolic encephalopathy   Patient Active Problem List    Diagnosis Date Noted Date Diagnosed    Moderate protein-calorie malnutrition (HCC) 2024     Metabolic encephalopathy 11/10/2024     Type 2 diabetes mellitus, with long-term current use of insulin (HCC) 11/10/2024     Hyperlipidemia 11/10/2024     Chest pain 11/10/2024     Anemia 11/10/2024     Hypoglycemia 11/10/2024     Depression 11/10/2024     Weight loss/loss of appetite 11/10/2024     History of subdural hematoma 2023     Paroxysmal SVT (supraventricular tachycardia) ([facility]) 2023     Acid reflux 2023     Asthma 2023     Coronary artery disease involving native coronary artery of native heart with angina pectoris (HCC) 2022     Primary hypertension 2022       LOS (days): 4  Geometric Mean LOS (GMLOS) (days): 4  Days to GMLOS:0     OBJECTIVE:  Risk of Unplanned Readmission Score: 9.92   Current admission status: Inpatient   Preferred Pharmacy:   [facility] [address] [place] [address]  Phone: [phone] Fax: [phone]    Primary Care Provider: Terrie Aguero MD    Primary Insurance: [employer] [facility] REP  Secondary Insurance:     DISCHARGE DETAILS:    Discharge planning discussed with:: Patient and [place] spouse  Freedom of Choice: Yes  Comments - Freedom of Choice: Discussed FOC  CM contacted family/caregiver?: Yes  Were Treatment Team discharge recommendations reviewed with patient/caregiver?: Yes  Did patient/caregiver verbalize understanding of patient care needs?: N/A- going to facility  Were patient/caregiver advised of the risks associated with not following Treatment Team discharge recommendations?: Yes    Other  Referral/Resources/Interventions Provided:  Interventions: SNF, Short Term Rehab  Referral Comments: [facility] able to accept  DC support to submit for auth. Pt and spouse in agreement with dcp.    Treatment Team Recommendation: Short Term Rehab, SNF  Discharge Destination Plan:: Short Term Rehab, SNF

## 2024-11-15 LAB
ATRIAL RATE: 120 BPM
GLUCOSE SERPL-MCNC: 104 MG/DL (ref 65–140)
GLUCOSE SERPL-MCNC: 135 MG/DL (ref 65–140)
GLUCOSE SERPL-MCNC: 172 MG/DL (ref 65–140)
GLUCOSE SERPL-MCNC: 196 MG/DL (ref 65–140)
GLUCOSE SERPL-MCNC: 244 MG/DL (ref 65–140)
P AXIS: 71 DEGREES
PR INTERVAL: 138 MS
QRS AXIS: 61 DEGREES
QRSD INTERVAL: 94 MS
QT INTERVAL: 322 MS
QTC INTERVAL: 455 MS
T WAVE AXIS: 206 DEGREES
VENTRICULAR RATE: 120 BPM

## 2024-11-15 PROCEDURE — 82948 REAGENT STRIP/BLOOD GLUCOSE: CPT

## 2024-11-15 PROCEDURE — 93005 ELECTROCARDIOGRAM TRACING: CPT

## 2024-11-15 PROCEDURE — 93010 ELECTROCARDIOGRAM REPORT: CPT | Performed by: INTERNAL MEDICINE

## 2024-11-15 PROCEDURE — 99232 SBSQ HOSP IP/OBS MODERATE 35: CPT | Performed by: NURSE PRACTITIONER

## 2024-11-15 RX ORDER — METOPROLOL TARTRATE 1 MG/ML
2.5 INJECTION, SOLUTION INTRAVENOUS ONCE
Status: COMPLETED | OUTPATIENT
Start: 2024-11-15 | End: 2024-11-15

## 2024-11-15 RX ORDER — ALBUMIN (HUMAN) 12.5 G/50ML
25 SOLUTION INTRAVENOUS ONCE
Status: COMPLETED | OUTPATIENT
Start: 2024-11-15 | End: 2024-11-15

## 2024-11-15 RX ADMIN — OLANZAPINE 15 MG: 10 TABLET, FILM COATED ORAL at 22:03

## 2024-11-15 RX ADMIN — SENNOSIDES AND DOCUSATE SODIUM 1 TABLET: 50; 8.6 TABLET ORAL at 22:03

## 2024-11-15 RX ADMIN — MONTELUKAST 10 MG: 10 TABLET, FILM COATED ORAL at 22:03

## 2024-11-15 RX ADMIN — ATORVASTATIN CALCIUM 40 MG: 40 TABLET, FILM COATED ORAL at 17:21

## 2024-11-15 RX ADMIN — INSULIN LISPRO 2 UNITS: 100 INJECTION, SOLUTION INTRAVENOUS; SUBCUTANEOUS at 12:11

## 2024-11-15 RX ADMIN — HEPARIN SODIUM 5000 UNITS: 5000 INJECTION INTRAVENOUS; SUBCUTANEOUS at 06:26

## 2024-11-15 RX ADMIN — ALPRAZOLAM 0.25 MG: 0.25 TABLET ORAL at 00:41

## 2024-11-15 RX ADMIN — BUPROPION HYDROCHLORIDE 150 MG: 150 TABLET, EXTENDED RELEASE ORAL at 08:22

## 2024-11-15 RX ADMIN — METOPROLOL TARTRATE 2.5 MG: 1 INJECTION, SOLUTION INTRAVENOUS at 12:11

## 2024-11-15 RX ADMIN — Medication 2.5 MG: at 09:19

## 2024-11-15 RX ADMIN — INSULIN LISPRO 1 UNITS: 100 INJECTION, SOLUTION INTRAVENOUS; SUBCUTANEOUS at 22:03

## 2024-11-15 RX ADMIN — POLYETHYLENE GLYCOL 3350 17 G: 17 POWDER, FOR SOLUTION ORAL at 08:22

## 2024-11-15 RX ADMIN — ALBUMIN (HUMAN) 25 G: 0.25 INJECTION, SOLUTION INTRAVENOUS at 10:51

## 2024-11-15 RX ADMIN — INSULIN LISPRO 1 UNITS: 100 INJECTION, SOLUTION INTRAVENOUS; SUBCUTANEOUS at 08:25

## 2024-11-15 RX ADMIN — ASPIRIN 81 MG CHEWABLE TABLET 81 MG: 81 TABLET CHEWABLE at 08:22

## 2024-11-15 RX ADMIN — HEPARIN SODIUM 5000 UNITS: 5000 INJECTION INTRAVENOUS; SUBCUTANEOUS at 14:47

## 2024-11-15 RX ADMIN — HEPARIN SODIUM 5000 UNITS: 5000 INJECTION INTRAVENOUS; SUBCUTANEOUS at 22:03

## 2024-11-15 NOTE — ASSESSMENT & PLAN NOTE
Presented with R sided weakness, confusion, speech difficulty   Noted to be hypoglycemic with glucose in 30s. On arrival   CT head with no acute findings   CTA head and neck with moderate stenoses of the origins of both vertebral arteries, moderate stenosis of the left subclavian artery origin approximately 50%  Seen by neurology given concern for stroke like sx with R sided weakness   MRI brain negative, no acute findings   Likely all in the setting of hypoglycemia.  Sx have now resolved.  See below plans.   PT/OT plan for rehab   Confirmed her home zyprexa dosing is 15 mg QHS   Pt however, with symptoms of depression /lethargy ? Etiology along with poor appetite / exercise/motivation.

## 2024-11-15 NOTE — ASSESSMENT & PLAN NOTE
Ct home meds Bupropion  mg daily, Olanzapine 15 mg daily  We had long discussion in regard to motivation and setting short term goals   She has a goal to see her daughter in [place] for thanksgiving - she will try to work to this goal    has preference to take her home as he feels she has not done well previously in rehab   He feels he will be able to do better with her   RN to get randee to assist pt on a short walk now and guide  in assisting her walking for home discharge plan

## 2024-11-15 NOTE — PROGRESS NOTES
Progress Note - Hospitalist   Name: Tavia Eldridge 79 y.o. female I MRN: [id number]  Unit/Bed#: [facility] 612-01 I Date of Admission: 11/10/2024   Date of Service: 11/15/2024 I Hospital Day: 5    Assessment & Plan  Metabolic encephalopathy  Presented with R sided weakness, confusion, speech difficulty   Noted to be hypoglycemic with glucose in 30s. On arrival   CT head with no acute findings   CTA head and neck with moderate stenoses of the origins of both vertebral arteries, moderate stenosis of the left subclavian artery origin approximately 50%  Seen by neurology given concern for stroke like sx with R sided weakness   MRI brain negative, no acute findings   Likely all in the setting of hypoglycemia.  Sx have now resolved.  See below plans.   PT/OT plan for rehab   Confirmed her home zyprexa dosing is 15 mg QHS   Pt however, with symptoms of depression /lethargy ? Etiology along with poor appetite / exercise/motivation.   Hypoglycemia  Hypoglycemic on arrival and etiology of AMS as above.   With hx of T2DM on Lantus 19 units daily and Prandin outpt   A1C is 5.9. Pt has had poor appetite x months now.  Advise to stop Lantus and  Prandin on discharge and to only monitor with diet for now  Pt and her  report poor oral intake likely due to depression sp loss of child  Resolved   Type 2 diabetes mellitus, with long-term current use of insulin ([facility])  Lab Results   Component Value Date    HGBA1C 5.9 (H) 11/11/2024       Recent Labs     11/15/24  0143 11/15/24  0806 11/15/24  1148 11/15/24  1602   POCGLU 135 172* 244* 104       Blood Sugar Average: Last 72 hrs:  (P) [id number]  With hypoglycemia as above  Home regimen: Lantus 19 hs, Repaglinide 1 mg TID  Updated A1c 5.9, pt reports to poor appetite.  Recommend to stop/hold Lantus and Prandin at discharge, and monitor with diet only.  Should f/u with PCP for further long term mgmt.  Pt/ agreeable.   SSI, accu checks while IP.   Chest pain  Complains of  retrosternal chest pressure with shortness of breath and relieved with rest with occasional episodes at rest since a few months  H/o CAD - NSTEMI in 2022, cath with 50% stenosis of proximal RCA  Troponin negative  Cardiology following, no further IP work up, can f/u outpt   Unclear why she is not on BB but BP is on lower side, would hold on starting this for now, defer to outpt providers   Coronary artery disease involving native coronary artery of native heart with angina pectoris (HCC)  As above  Asthma  No exacerbation  Ct home Singulair  Albuterol prn  Primary hypertension  Home regimen: Lisinopril 10 mg daily, currently held with concern initially for possible stroke, will continue to hold with normotension   Weight loss/loss of appetite  Weight loss of around 7 lbs in a few weeks   Progressively worsening loss of appetite, abdominal bloating  CT C/A/P unremarkable with no sign of malignancy  Outpt f/u   Hyperlipidemia  Ct Atorvastatin 40 mg daily  Anemia  Drop in Hb to 10 from baseline of 12/13 in 2023   Iron panel, B12, folate unremarkable  Outpt work up/follow up.  Her hgb has been stable since admission.   Depression  Ct home meds Bupropion  mg daily, Olanzapine 15 mg daily  We had long discussion in regard to motivation and setting short term goals   She has a goal to see her daughter in [place] for thanksgiving - she will try to work to this goal    has preference to take her home as he feels she has not done well previously in rehab   He feels he will be able to do better with her   RN to get restoratvie to assist pt on a short walk now and guide  in assisting her walking for home discharge plan   Paroxysmal SVT (supraventricular tachycardia) (HCC)  H/o paroxysmal SVT  Pt has  been noted to have some tachycardia since yesterday ,   Ordered 1 x dose of albumin for improved pressure HR now in the 90's and doing better   History of subdural hematoma  H/o SDH many years ago - s/p right  frontotemporal craniotomy  CT head obtained on admission with no acute findings   Moderate protein-calorie malnutrition (HCC)  Malnutrition Findings:   Adult Malnutrition type: Chronic illness  Adult Degree of Malnutrition: Malnutrition of moderate degree  Malnutrition Characteristics: Muscle loss, Fat loss, Inadequate energy       360 Statement: Chronic moderate pro, bri malnutrition d/t condition, decreased appetite as evidence by <75% energy intake >  1month, mild muscle and fat loss at temples, orbitals, triceps, interroseous of hands, BMI 17, treated with oral diet, Ensure compact BID, discussed tips of increasing pro, bri intake at home. If appetite doesn't improve may need to consider appetite stimulant.    BMI Findings:  Adult BMI Classifications: Underweight < 18.5        Body mass index is 17.01 kg/m².   Of note -- she had restriction of no chocolate.  She prefers chocolate shakes, likely this was placed in case of plan for IP stress test which can be done outpt.  Will lift this restriction and ordered chocolate instead of vanilla Ensure     VTE Pharmacologic Prophylaxis: VTE Score: 3 High Risk (Score >/= 5) - Pharmacological DVT Prophylaxis Ordered: heparin. Sequential Compression Devices Ordered.    Mobility:   Basic Mobility Inpatient Raw Score: 16  JH-HLM Goal: 5: Stand one or more mins  JH-HLM Achieved: 6: Walk 10 steps or more  JH-HLM Goal achieved. Continue to encourage appropriate mobility.    Patient Centered Rounds: I performed bedside rounds with nursing staff today.   Discussions with Specialists or Other Care Team Provider: nursing     Education and Discussions with Family / Patient: Updated  () at bedside.    Current Length of Stay: 5 day(s)  Current Patient Status: Inpatient   Certification Statement: The patient will continue to require additional inpatient hospital stay due to ongoing need for observation of heart rate ability to ambulate and oral intake.  Discharge  Plan: Anticipate discharge in 24-48 hrs to home with home services.    Code Status: Level 1 - Full Code    Subjective   Patient laying in bed upon arrival.  Woken up discussed in great detail need for eating drinking hydrating.  Patient appears depressed reports that she is depressed.   with preference to not take patient to rehabilitation as he feels he can do better in regards to setting up her regimen with her to ambulate and move around.  She denies dizziness or lightheadedness no chest pain or palpitations does report that she has not had a bowel movement in few days.  But patient's appetite is rather poor.    Objective :  Temp:  [97.5 °F (36.4 °C)-98 °F (36.7 °C)] 98 °F (36.7 °C)  HR:  [] 89  BP: ()/(53-85) 101/53  Resp:  [18] 18  SpO2:  [91 %-96 %] 92 %  O2 Device: None (Room air)    Body mass index is 17.01 kg/m².     Input and Output Summary (last 24 hours):     Intake/Output Summary (Last 24 hours) at 11/15/2024 1614  Last data filed at 11/15/2024 0002  Gross per 24 hour   Intake 250 ml   Output --   Net 250 ml       Physical Exam  Constitutional:       General: She is not in acute distress.     Appearance: She is not ill-appearing, toxic-appearing or diaphoretic.      Comments: Cachectic   HENT:      Head: Normocephalic and atraumatic.   Eyes:      General:         Right eye: No discharge.         Left eye: No discharge.   Cardiovascular:      Rate and Rhythm: Normal rate.      Heart sounds: No murmur heard.     No friction rub. No gallop.      Comments: Was tachy today now improved   Pulmonary:      Effort: No respiratory distress.      Breath sounds: No stridor. No wheezing, rhonchi or rales.   Chest:      Chest wall: No tenderness.   Abdominal:      General: Abdomen is flat. There is no distension.      Palpations: There is no mass.      Tenderness: There is no abdominal tenderness. There is no guarding or rebound.      Hernia: No hernia is present.   Musculoskeletal:          General: No swelling, tenderness, deformity or signs of injury.      Right lower leg: No edema.      Left lower leg: No edema.   Skin:     Coloration: Skin is not jaundiced or pale.      Findings: No bruising, erythema, lesion or rash.   Neurological:      Mental Status: She is alert and oriented to person, place, and time.   Psychiatric:      Comments: Very depressed affect            Lines/Drains:              Lab Results: I have reviewed the following results:   Results from last 7 days   Lab Units 11/14/24  1701 11/12/24  0532   WBC Thousand/uL 7.55 7.20   HEMOGLOBIN g/dL 11.2* 11.0*   HEMATOCRIT % 34.1* 34.3*   PLATELETS Thousands/uL 383 345   SEGS PCT %  --  59   LYMPHO PCT %  --  29   MONO PCT %  --  10   EOS PCT %  --  1     Results from last 7 days   Lab Units 11/14/24  1701   SODIUM mmol/L 141   POTASSIUM mmol/L 4.4   CHLORIDE mmol/L 103   CO2 mmol/L 32   BUN mg/dL 12   CREATININE mg/dL 1.22   ANION GAP mmol/L 6   CALCIUM mg/dL 9.7   ALBUMIN g/dL 3.6   TOTAL BILIRUBIN mg/dL 0.24   ALK PHOS U/L 60   ALT U/L 12   AST U/L 15   GLUCOSE RANDOM mg/dL 155*     Results from last 7 days   Lab Units 11/10/24  0903   INR  1.08     Results from last 7 days   Lab Units 11/15/24  1602 11/15/24  1148 11/15/24  0806 11/15/24  0143 11/14/24  2104 11/14/24  1727 11/14/24  1603 11/14/24  1514 11/14/24  1120 11/14/24  0748 11/14/24  0511 11/14/24  0157   POC GLUCOSE mg/dl 104 244* 172* 135 290* 157* 95 90 225* 129 132 80     Results from last 7 days   Lab Units 11/11/24  0646   HEMOGLOBIN A1C % 5.9*           Recent Cultures (last 7 days):         Imaging Results Review: No pertinent imaging studies reviewed.  Other Study Results Review: No additional pertinent studies reviewed.    Last 24 Hours Medication List:     Current Facility-Administered Medications:     acetaminophen (TYLENOL) tablet 650 mg, Q4H PRN    ALPRAZolam (XANAX) tablet 0.25 mg, HS PRN    aspirin chewable tablet 81 mg, Daily    atorvastatin (LIPITOR) tablet  40 mg, QPM    buPROPion (WELLBUTRIN XL) 24 hr tablet 150 mg, Daily    cyanocobalamin injection 1,000 mcg, Q30 Days    heparin (porcine) subcutaneous injection 5,000 Units, Q8H KYRA    insulin lispro (HumALOG/ADMELOG) 100 units/mL subcutaneous injection 1-5 Units, TID AC **AND** Fingerstick Glucose (POCT), TID AC    insulin lispro (HumALOG/ADMELOG) 100 units/mL subcutaneous injection 1-5 Units, HS    insulin lispro (HumALOG/ADMELOG) 100 units/mL subcutaneous injection 1-5 Units, 0200    montelukast (SINGULAIR) tablet 10 mg, HS    OLANZapine (ZyPREXA) tablet 15 mg, HS    ondansetron (ZOFRAN) injection 4 mg, Q6H PRN    oxyCODONE (ROXICODONE) split tablet 2.5 mg, Q4H PRN    polyethylene glycol (MIRALAX) packet 17 g, Daily    senna-docusate sodium (SENOKOT S) 8.6-50 mg per tablet 1 tablet, HS    Administrative Statements   Today, Patient Was Seen By: ROSE Krishnan      **Please Note: This note may have been constructed using a voice recognition system.**

## 2024-11-15 NOTE — CASE MANAGEMENT
Case Management Discharge Planning Note    Patient name Tavia Eldridge  Location [facility] 612/[facility] 612-01 MRN [id number]  : 1945 Date 11/15/2024       Current Admission Date: 11/10/2024  Current Admission Diagnosis:Metabolic encephalopathy   Patient Active Problem List    Diagnosis Date Noted Date Diagnosed    Moderate protein-calorie malnutrition (HCC) 2024     Metabolic encephalopathy 11/10/2024     Type 2 diabetes mellitus, with long-term current use of insulin (HCC) 11/10/2024     Hyperlipidemia 11/10/2024     Chest pain 11/10/2024     Anemia 11/10/2024     Hypoglycemia 11/10/2024     Depression 11/10/2024     Weight loss/loss of appetite 11/10/2024     History of subdural hematoma 2023     Paroxysmal SVT (supraventricular tachycardia) ([facility]) 2023     Acid reflux 2023     Asthma 2023     Coronary artery disease involving native coronary artery of native heart with angina pectoris (HCC) 2022     Primary hypertension 2022       LOS (days): 5  Geometric Mean LOS (GMLOS) (days): 4  Days to GMLOS:-1.3     OBJECTIVE:  Risk of Unplanned Readmission Score: 10.72   Current admission status: Inpatient   Preferred Pharmacy:   [facility] [address] [place] [address]  Phone: [phone] Fax: [phone]    Primary Care Provider: Terrie Aguero MD    Primary Insurance: [employer] REP  Secondary Insurance:     DISCHARGE DETAILS:    Discharge planning discussed with:: Patient and [place] spouse  Freedom of Choice: Yes  Comments - Freedom of Choice: Discussed FOC  CM contacted family/caregiver?: Yes  Were Treatment Team discharge recommendations reviewed with patient/caregiver?: Yes  Did patient/caregiver verbalize understanding of patient care needs?: Yes  Were patient/caregiver advised of the risks associated with not following Treatment Team discharge recommendations?: Yes    Other  Referral/Resources/Interventions Provided:  Referral Comments: This CM discussed with pt and spouse therapy recs. Pt and spouse no longer agreeable to STR, and requesting HHC. Spouse reports previous experience with SNF and has now changed mind. Spouse and pt advised of risk associated with not following treatment team recs. [facility] referral for HHC entered in aidin, awaiting response please follow up with pt, and spouse regarding choice.      Treatment Team Recommendation: Short Term Rehab, SNF

## 2024-11-15 NOTE — ASSESSMENT & PLAN NOTE
Lab Results   Component Value Date    HGBA1C 5.9 (H) 11/11/2024       Recent Labs     11/15/24  0143 11/15/24  0806 11/15/24  1148 11/15/24  1602   POCGLU 135 172* 244* 104       Blood Sugar Average: Last 72 hrs:  (P) [id number]  With hypoglycemia as above  Home regimen: Lantus 19 hs, Repaglinide 1 mg TID  Updated A1c 5.9, pt reports to poor appetite.  Recommend to stop/hold Lantus and Prandin at discharge, and monitor with diet only.  Should f/u with PCP for further long term mgmt.  Pt/ agreeable.   SSI, accu checks while IP.

## 2024-11-15 NOTE — RESTORATIVE TECHNICIAN NOTE
Restorative Technician Note      Patient Name: Tavia Eldridge     Restorative Tech Visit Date: 11/15/24  Note Type: Mobility  Patient Position Upon Consult: Supine  Activity Performed: Ambulated  Assistive Device: Roller walker  Patient Position at End of Consult: Bedside chair; All needs within reach; Bed/Chair alarm activated  Nurse Communication: Nurse aware of consult, application of brace    Ezequiel Delgado, Restorative Technician

## 2024-11-15 NOTE — ASSESSMENT & PLAN NOTE
Hypoglycemic on arrival and etiology of AMS as above.   With hx of T2DM on Lantus 19 units daily and Prandin outpt   A1C is 5.9. Pt has had poor appetite x months now.  Advise to stop Lantus and  Prandin on discharge and to only monitor with diet for now  Pt and her  report poor oral intake likely due to depression sp loss of child  Resolved

## 2024-11-15 NOTE — PLAN OF CARE
Problem: Prexisting or High Potential for Compromised Skin Integrity  Goal: Skin integrity is maintained or improved  Description: INTERVENTIONS:  - Identify patients at risk for skin breakdown  - Assess and monitor skin integrity  - Assess and monitor nutrition and hydration status  - Monitor labs   - Assess for incontinence   - Turn and reposition patient  - Assist with mobility/ambulation  - Relieve pressure over bony prominences  - Avoid friction and shearing  - Provide appropriate hygiene as needed including keeping skin clean and dry  - Evaluate need for skin moisturizer/barrier cream  - Collaborate with interdisciplinary team   - Patient/family teaching  - Consider wound care consult   Outcome: Progressing     Problem: PAIN - ADULT  Goal: Verbalizes/displays adequate comfort level or baseline comfort level  Description: Interventions:  - Encourage patient to monitor pain and request assistance  - Assess pain using appropriate pain scale  - Administer analgesics based on type and severity of pain and evaluate response  - Implement non-pharmacological measures as appropriate and evaluate response  - Consider cultural and social influences on pain and pain management  - Notify physician/advanced practitioner if interventions unsuccessful or patient reports new pain  Outcome: Progressing     Problem: INFECTION - ADULT  Goal: Absence or prevention of progression during hospitalization  Description: INTERVENTIONS:  - Assess and monitor for signs and symptoms of infection  - Monitor lab/diagnostic results  - Monitor all insertion sites, i.e. indwelling lines, tubes, and drains  - Monitor endotracheal if appropriate and nasal secretions for changes in amount and color  - [place] appropriate cooling/warming therapies per order  - Administer medications as ordered  - Instruct and encourage patient and family to use good hand hygiene technique  - Identify and instruct in appropriate isolation precautions for  identified infection/condition  Outcome: Progressing     Problem: SAFETY ADULT  Goal: Patient will remain free of falls  Description: INTERVENTIONS:  - Educate patient/family on patient safety including physical limitations  - Instruct patient to call for assistance with activity   - Consult OT/PT to assist with strengthening/mobility   - Keep Call bell within reach  - Keep bed low and locked with side rails adjusted as appropriate  - Keep care items and personal belongings within reach  - Initiate and maintain comfort rounds  - Make Fall Risk Sign visible to staff  -   - Apply yellow socks and bracelet for high fall risk patients  - Consider moving patient to room near nurses station  Outcome: Progressing  Goal: Maintain or return to baseline ADL function  Description: INTERVENTIONS:  -  Assess patient's ability to carry out ADLs; assess patient's baseline for ADL function and identify physical deficits which impact ability to perform ADLs (bathing, care of mouth/teeth, toileting, grooming, dressing, etc.)  - Assess/evaluate cause of self-care deficits   - Assess range of motion  - Assess patient's mobility; develop plan if impaired  - Assess patient's need for assistive devices and provide as appropriate  - Encourage maximum independence but intervene and supervise when necessary  - Involve family in performance of ADLs  - Assess for home care needs following discharge   - Consider OT consult to assist with ADL evaluation and planning for discharge  - Provide patient education as appropriate  Outcome: Progressing  Goal: Maintains/Returns to pre admission functional level  Description: INTERVENTIONS:  - Perform AM-PAC 6 Click Basic Mobility/ Daily Activity assessment daily.  - Set and communicate daily mobility goal to care team and patient/family/caregiver.   - Collaborate with rehabilitation services on mobility goals if consulted  - - Out of bed for toileting  - Record patient progress and toleration of activity  level   Outcome: Progressing     Problem: DISCHARGE PLANNING  Goal: Discharge to home or other facility with appropriate resources  Description: INTERVENTIONS:  - Identify barriers to discharge w/patient and caregiver  - Arrange for needed discharge resources and transportation as appropriate  - Identify discharge learning needs (meds, wound care, etc.)  - Arrange for interpretive services to assist at discharge as needed  - Refer to Case Management Department for coordinating discharge planning if the patient needs post-hospital services based on physician/advanced practitioner order or complex needs related to functional status, cognitive ability, or social support system  Outcome: Progressing     Problem: Knowledge Deficit  Goal: Patient/family/caregiver demonstrates understanding of disease process, treatment plan, medications, and discharge instructions  Description: Complete learning assessment and assess knowledge base.  Interventions:  - Provide teaching at level of understanding  - Provide teaching via preferred learning methods  Outcome: Progressing     Problem: NEUROSENSORY - ADULT  Goal: Achieves stable or improved neurological status  Description: INTERVENTIONS  - Monitor and report changes in neurological status  - Monitor vital signs such as temperature, blood pressure, glucose, and any other labs ordered   - Initiate measures to prevent increased intracranial pressure  - Monitor for seizure activity and implement precautions if appropriate      Outcome: Progressing  Goal: Achieves maximal functionality and self care  Description: INTERVENTIONS  - Monitor swallowing and airway patency with patient fatigue and changes in neurological status  - Encourage and assist patient to increase activity and self care.   - Encourage visually impaired, hearing impaired and aphasic patients to use assistive/communication devices  Outcome: Progressing     Problem: GASTROINTESTINAL - ADULT  Goal: Maintains adequate  nutritional intake  Description: INTERVENTIONS:  - Monitor percentage of each meal consumed  - Identify factors contributing to decreased intake, treat as appropriate  - Assist with meals as needed  - Monitor I&O, weight, and lab values if indicated  - Obtain nutrition services referral as needed  Outcome: Progressing     Problem: METABOLIC, FLUID AND ELECTROLYTES - ADULT  Goal: Electrolytes maintained within normal limits  Description: INTERVENTIONS:  - Monitor labs and assess patient for signs and symptoms of electrolyte imbalances  - Administer electrolyte replacement as ordered  - Monitor response to electrolyte replacements, including repeat lab results as appropriate  - Instruct patient on fluid and nutrition as appropriate  Outcome: Progressing  Goal: Fluid balance maintained  Description: INTERVENTIONS:  - Monitor labs   - Monitor I/O and WT  - Instruct patient on fluid and nutrition as appropriate  - Assess for signs & symptoms of volume excess or deficit  Outcome: Progressing  Goal: Glucose maintained within target range  Description: INTERVENTIONS:  - Monitor Blood Glucose as ordered  - Assess for signs and symptoms of hyperglycemia and hypoglycemia  - Administer ordered medications to maintain glucose within target range  - Assess nutritional intake and initiate nutrition service referral as needed  Outcome: Progressing     Problem: Nutrition/Hydration-ADULT  Goal: Nutrient/Hydration intake appropriate for improving, restoring or maintaining nutritional needs  Description: Monitor and assess patient's nutrition/hydration status for malnutrition. Collaborate with interdisciplinary team and initiate plan and interventions as ordered.  Monitor patient's weight and dietary intake as ordered or per policy. Utilize nutrition screening tool and intervene as necessary. Determine patient's food preferences and provide high-protein, high-caloric foods as appropriate.     INTERVENTIONS:  - Monitor oral intake,  urinary output, labs, and treatment plans  - Assess nutrition and hydration status and recommend course of action  - Evaluate amount of meals eaten  - Assist patient with eating if necessary   - Allow adequate time for meals  - Recommend/ encourage appropriate diets, oral nutritional supplements, and vitamin/mineral supplements  - Order, calculate, and assess calorie counts as needed  - Recommend, monitor, and adjust tube feedings and TPN/PPN based on assessed needs  - Assess need for intravenous fluids  - Provide specific nutrition/hydration education as appropriate  - Include patient/family/caregiver in decisions related to nutrition  Outcome: Progressing

## 2024-11-15 NOTE — ASSESSMENT & PLAN NOTE
H/o paroxysmal SVT  Pt has  been noted to have some tachycardia since yesterday ,   Ordered 1 x dose of albumin for improved pressure HR now in the 90's and doing better

## 2024-11-16 VITALS
DIASTOLIC BLOOD PRESSURE: 60 MMHG | SYSTOLIC BLOOD PRESSURE: 100 MMHG | TEMPERATURE: 97.9 F | OXYGEN SATURATION: 96 % | HEIGHT: 63 IN | HEART RATE: 100 BPM | RESPIRATION RATE: 17 BRPM | BODY MASS INDEX: 17.01 KG/M2 | WEIGHT: 96 LBS

## 2024-11-16 PROBLEM — R07.9 CHEST PAIN: Status: RESOLVED | Noted: 2024-11-10 | Resolved: 2024-11-16

## 2024-11-16 LAB
ANION GAP SERPL CALCULATED.3IONS-SCNC: 11 MMOL/L (ref 4–13)
BASOPHILS # BLD AUTO: 0.04 THOUSANDS/ÂΜL (ref 0–0.1)
BASOPHILS NFR BLD AUTO: 0 % (ref 0–1)
BUN SERPL-MCNC: 18 MG/DL (ref 5–25)
CALCIUM SERPL-MCNC: 10.2 MG/DL (ref 8.4–10.2)
CHLORIDE SERPL-SCNC: 98 MMOL/L (ref 96–108)
CO2 SERPL-SCNC: 32 MMOL/L (ref 21–32)
CREAT SERPL-MCNC: 1 MG/DL (ref 0.6–1.3)
EOSINOPHIL # BLD AUTO: 0.15 THOUSAND/ÂΜL (ref 0–0.61)
EOSINOPHIL NFR BLD AUTO: 2 % (ref 0–6)
ERYTHROCYTE [DISTWIDTH] IN BLOOD BY AUTOMATED COUNT: 14.4 % (ref 11.6–15.1)
GFR SERPL CREATININE-BSD FRML MDRD: 54 ML/MIN/1.73SQ M
GLUCOSE SERPL-MCNC: 134 MG/DL (ref 65–140)
GLUCOSE SERPL-MCNC: 163 MG/DL (ref 65–140)
GLUCOSE SERPL-MCNC: 164 MG/DL (ref 65–140)
GLUCOSE SERPL-MCNC: 254 MG/DL (ref 65–140)
HCT VFR BLD AUTO: 36.7 % (ref 34.8–46.1)
HGB BLD-MCNC: 11.6 G/DL (ref 11.5–15.4)
IMM GRANULOCYTES # BLD AUTO: 0.09 THOUSAND/UL (ref 0–0.2)
IMM GRANULOCYTES NFR BLD AUTO: 1 % (ref 0–2)
LYMPHOCYTES # BLD AUTO: 2.47 THOUSANDS/ÂΜL (ref 0.6–4.47)
LYMPHOCYTES NFR BLD AUTO: 27 % (ref 14–44)
MCH RBC QN AUTO: 28.9 PG (ref 26.8–34.3)
MCHC RBC AUTO-ENTMCNC: 31.6 G/DL (ref 31.4–37.4)
MCV RBC AUTO: 92 FL (ref 82–98)
MONOCYTES # BLD AUTO: 0.67 THOUSAND/ÂΜL (ref 0.17–1.22)
MONOCYTES NFR BLD AUTO: 7 % (ref 4–12)
NEUTROPHILS # BLD AUTO: 5.84 THOUSANDS/ÂΜL (ref 1.85–7.62)
NEUTS SEG NFR BLD AUTO: 63 % (ref 43–75)
NRBC BLD AUTO-RTO: 0 /100 WBCS
PLATELET # BLD AUTO: 399 THOUSANDS/UL (ref 149–390)
PMV BLD AUTO: 10.3 FL (ref 8.9–12.7)
POTASSIUM SERPL-SCNC: 4.3 MMOL/L (ref 3.5–5.3)
RBC # BLD AUTO: 4.01 MILLION/UL (ref 3.81–5.12)
SODIUM SERPL-SCNC: 141 MMOL/L (ref 135–147)
WBC # BLD AUTO: 9.26 THOUSAND/UL (ref 4.31–10.16)

## 2024-11-16 PROCEDURE — 80048 BASIC METABOLIC PNL TOTAL CA: CPT | Performed by: NURSE PRACTITIONER

## 2024-11-16 PROCEDURE — 82948 REAGENT STRIP/BLOOD GLUCOSE: CPT

## 2024-11-16 PROCEDURE — 99239 HOSP IP/OBS DSCHRG MGMT >30: CPT | Performed by: NURSE PRACTITIONER

## 2024-11-16 PROCEDURE — 85025 COMPLETE CBC W/AUTO DIFF WBC: CPT | Performed by: NURSE PRACTITIONER

## 2024-11-16 RX ORDER — OXYCODONE HYDROCHLORIDE 5 MG/1
2.5 TABLET ORAL EVERY 6 HOURS PRN
Qty: 10 TABLET | Refills: 0 | Status: SHIPPED | OUTPATIENT
Start: 2024-11-16 | End: 2024-11-21

## 2024-11-16 RX ADMIN — INSULIN LISPRO 1 UNITS: 100 INJECTION, SOLUTION INTRAVENOUS; SUBCUTANEOUS at 01:50

## 2024-11-16 RX ADMIN — HEPARIN SODIUM 5000 UNITS: 5000 INJECTION INTRAVENOUS; SUBCUTANEOUS at 13:22

## 2024-11-16 RX ADMIN — Medication 2.5 MG: at 01:50

## 2024-11-16 RX ADMIN — POLYETHYLENE GLYCOL 3350 17 G: 17 POWDER, FOR SOLUTION ORAL at 08:28

## 2024-11-16 RX ADMIN — INSULIN LISPRO 2 UNITS: 100 INJECTION, SOLUTION INTRAVENOUS; SUBCUTANEOUS at 12:15

## 2024-11-16 RX ADMIN — ASPIRIN 81 MG CHEWABLE TABLET 81 MG: 81 TABLET CHEWABLE at 08:28

## 2024-11-16 RX ADMIN — BUPROPION HYDROCHLORIDE 150 MG: 150 TABLET, EXTENDED RELEASE ORAL at 08:28

## 2024-11-16 RX ADMIN — HEPARIN SODIUM 5000 UNITS: 5000 INJECTION INTRAVENOUS; SUBCUTANEOUS at 05:03

## 2024-11-16 RX ADMIN — ALPRAZOLAM 0.25 MG: 0.25 TABLET ORAL at 00:20

## 2024-11-16 RX ADMIN — INSULIN LISPRO 1 UNITS: 100 INJECTION, SOLUTION INTRAVENOUS; SUBCUTANEOUS at 08:27

## 2024-11-16 NOTE — CASE MANAGEMENT
Case Management Discharge Planning Note    Patient name Tavia Eldridge  Location [facility] 612/[facility] 612-01 MRN [id number]  : 1945 Date 2024       Current Admission Date: 11/10/2024  Current Admission Diagnosis:Metabolic encephalopathy   Patient Active Problem List    Diagnosis Date Noted Date Diagnosed    Moderate protein-calorie malnutrition (HCC) 2024     Metabolic encephalopathy 11/10/2024     Type 2 diabetes mellitus, with long-term current use of insulin (HCC) 11/10/2024     Hyperlipidemia 11/10/2024     Chest pain 11/10/2024     Anemia 11/10/2024     Hypoglycemia 11/10/2024     Depression 11/10/2024     Weight loss/loss of appetite 11/10/2024     History of subdural hematoma 2023     Paroxysmal SVT (supraventricular tachycardia) ([facility]) 2023     Acid reflux 2023     Asthma 2023     Coronary artery disease involving native coronary artery of native heart with angina pectoris (HCC) 2022     Primary hypertension 2022       LOS (days): 6  Geometric Mean LOS (GMLOS) (days): 4  Days to GMLOS:-2.1     OBJECTIVE:  Risk of Unplanned Readmission Score: 9.82         Current admission status: Inpatient   Preferred Pharmacy:   [id number] - [place] [id number]  Phone: [phone] Fax: [phone]    Primary Care Provider: Terrie Aguero MD    Primary Insurance: [employer] REP  Secondary Insurance:     DISCHARGE DETAILS:               Requested Home Health Care         Is the patient interested in HHC at discharge?: Yes  Home Health Discipline requested:: Occupational Therapy, Physical Therapy  Home Health Agency Name:: [facility]  HHA External Referral Reason (only applicable if external HHA name selected): Services not provided in network or near patient location  Home Health Follow-Up Provider:: PCP  Home Health Services Needed:: Strengthening/Theraputic Exercises to Improve Function,  Gait/ADL Training, Evaluate Functional Status and Safety  Homebound Criteria Met:: Requires the Assistance of Another Person for Safe Ambulation or to Leave the Home, Uses an Assist Device (i.e. cane, walker, etc)  Supporting Clincal Findings:: Fatigues Easliy in Short Distances, Limited Endurance          IMM Given (Date):: 11/16/24  IMM Given to:: Family              Spoke with patient and  at bedside. Reiterated current recommendation for STR. Pt and  decline, and voice preference for HHC.   HHC choice list provided- [facility]   to transport home  IMM reviewed

## 2024-11-16 NOTE — PLAN OF CARE
Problem: Prexisting or High Potential for Compromised Skin Integrity  Goal: Skin integrity is maintained or improved  Description: INTERVENTIONS:  - Identify patients at risk for skin breakdown  - Assess and monitor skin integrity  - Assess and monitor nutrition and hydration status  - Monitor labs   - Assess for incontinence   - Turn and reposition patient  - Assist with mobility/ambulation  - Relieve pressure over bony prominences  - Avoid friction and shearing  - Provide appropriate hygiene as needed including keeping skin clean and dry  - Evaluate need for skin moisturizer/barrier cream  - Collaborate with interdisciplinary team   - Patient/family teaching  - Consider wound care consult   Outcome: Progressing     Problem: PAIN - ADULT  Goal: Verbalizes/displays adequate comfort level or baseline comfort level  Description: Interventions:  - Encourage patient to monitor pain and request assistance  - Assess pain using appropriate pain scale  - Administer analgesics based on type and severity of pain and evaluate response  - Implement non-pharmacological measures as appropriate and evaluate response  - Consider cultural and social influences on pain and pain management  - Notify physician/advanced practitioner if interventions unsuccessful or patient reports new pain  Outcome: Progressing     Problem: INFECTION - ADULT  Goal: Absence or prevention of progression during hospitalization  Description: INTERVENTIONS:  - Assess and monitor for signs and symptoms of infection  - Monitor lab/diagnostic results  - Monitor all insertion sites, i.e. indwelling lines, tubes, and drains  - Monitor endotracheal if appropriate and nasal secretions for changes in amount and color  - [place] appropriate cooling/warming therapies per order  - Administer medications as ordered  - Instruct and encourage patient and family to use good hand hygiene technique  - Identify and instruct in appropriate isolation precautions for  identified infection/condition  Outcome: Progressing     Problem: SAFETY ADULT  Goal: Patient will remain free of falls  Description: INTERVENTIONS:  - Educate patient/family on patient safety including physical limitations  - Instruct patient to call for assistance with activity   - Consult OT/PT to assist with strengthening/mobility   - Keep Call bell within reach  - Keep bed low and locked with side rails adjusted as appropriate  - Keep care items and personal belongings within reach  - Initiate and maintain comfort rounds  - Make Fall Risk Sign visible to staff  - Offer Toileting every 2 Hours, in advance of need  - Initiate/Maintain alarm  - Obtain necessary fall risk management equipment:   - Apply yellow socks and bracelet for high fall risk patients  - Consider moving patient to room near nurses station  Outcome: Progressing  Goal: Maintain or return to baseline ADL function  Description: INTERVENTIONS:  -  Assess patient's ability to carry out ADLs; assess patient's baseline for ADL function and identify physical deficits which impact ability to perform ADLs (bathing, care of mouth/teeth, toileting, grooming, dressing, etc.)  - Assess/evaluate cause of self-care deficits   - Assess range of motion  - Assess patient's mobility; develop plan if impaired  - Assess patient's need for assistive devices and provide as appropriate  - Encourage maximum independence but intervene and supervise when necessary  - Involve family in performance of ADLs  - Assess for home care needs following discharge   - Consider OT consult to assist with ADL evaluation and planning for discharge  - Provide patient education as appropriate  Outcome: Progressing  Goal: Maintains/Returns to pre admission functional level  Description: INTERVENTIONS:  - Perform AM-PAC 6 Click Basic Mobility/ Daily Activity assessment daily.  - Set and communicate daily mobility goal to care team and patient/family/caregiver.   - Collaborate with  rehabilitation services on mobility goals if consulted  - Perform Range of Motion 3 times a day.  - Reposition patient every 2 hours.  - Dangle patient 3 times a day  - Stand patient 3 times a day  - Ambulate patient 3 times a day  - Out of bed to chair 3 times a day   - Out of bed for meals 3 times a day  - Out of bed for toileting  - Record patient progress and toleration of activity level   Outcome: Progressing     Problem: DISCHARGE PLANNING  Goal: Discharge to home or other facility with appropriate resources  Description: INTERVENTIONS:  - Identify barriers to discharge w/patient and caregiver  - Arrange for needed discharge resources and transportation as appropriate  - Identify discharge learning needs (meds, wound care, etc.)  - Arrange for interpretive services to assist at discharge as needed  - Refer to Case Management Department for coordinating discharge planning if the patient needs post-hospital services based on physician/advanced practitioner order or complex needs related to functional status, cognitive ability, or social support system  Outcome: Progressing     Problem: Knowledge Deficit  Goal: Patient/family/caregiver demonstrates understanding of disease process, treatment plan, medications, and discharge instructions  Description: Complete learning assessment and assess knowledge base.  Interventions:  - Provide teaching at level of understanding  - Provide teaching via preferred learning methods  Outcome: Progressing     Problem: NEUROSENSORY - ADULT  Goal: Achieves stable or improved neurological status  Description: INTERVENTIONS  - Monitor and report changes in neurological status  - Monitor vital signs such as temperature, blood pressure, glucose, and any other labs ordered   - Initiate measures to prevent increased intracranial pressure  - Monitor for seizure activity and implement precautions if appropriate      Outcome: Progressing  Goal: Achieves maximal functionality and self  care  Description: INTERVENTIONS  - Monitor swallowing and airway patency with patient fatigue and changes in neurological status  - Encourage and assist patient to increase activity and self care.   - Encourage visually impaired, hearing impaired and aphasic patients to use assistive/communication devices  Outcome: Progressing     Problem: GASTROINTESTINAL - ADULT  Goal: Maintains adequate nutritional intake  Description: INTERVENTIONS:  - Monitor percentage of each meal consumed  - Identify factors contributing to decreased intake, treat as appropriate  - Assist with meals as needed  - Monitor I&O, weight, and lab values if indicated  - Obtain nutrition services referral as needed  Outcome: Progressing     Problem: METABOLIC, FLUID AND ELECTROLYTES - ADULT  Goal: Electrolytes maintained within normal limits  Description: INTERVENTIONS:  - Monitor labs and assess patient for signs and symptoms of electrolyte imbalances  - Administer electrolyte replacement as ordered  - Monitor response to electrolyte replacements, including repeat lab results as appropriate  - Instruct patient on fluid and nutrition as appropriate  Outcome: Progressing  Goal: Fluid balance maintained  Description: INTERVENTIONS:  - Monitor labs   - Monitor I/O and WT  - Instruct patient on fluid and nutrition as appropriate  - Assess for signs & symptoms of volume excess or deficit  Outcome: Progressing  Goal: Glucose maintained within target range  Description: INTERVENTIONS:  - Monitor Blood Glucose as ordered  - Assess for signs and symptoms of hyperglycemia and hypoglycemia  - Administer ordered medications to maintain glucose within target range  - Assess nutritional intake and initiate nutrition service referral as needed  Outcome: Progressing     Problem: Nutrition/Hydration-ADULT  Goal: Nutrient/Hydration intake appropriate for improving, restoring or maintaining nutritional needs  Description: Monitor and assess patient's  nutrition/hydration status for malnutrition. Collaborate with interdisciplinary team and initiate plan and interventions as ordered.  Monitor patient's weight and dietary intake as ordered or per policy. Utilize nutrition screening tool and intervene as necessary. Determine patient's food preferences and provide high-protein, high-caloric foods as appropriate.     INTERVENTIONS:  - Monitor oral intake, urinary output, labs, and treatment plans  - Assess nutrition and hydration status and recommend course of action  - Evaluate amount of meals eaten  - Assist patient with eating if necessary   - Allow adequate time for meals  - Recommend/ encourage appropriate diets, oral nutritional supplements, and vitamin/mineral supplements  - Order, calculate, and assess calorie counts as needed  - Recommend, monitor, and adjust tube feedings and TPN/PPN based on assessed needs  - Assess need for intravenous fluids  - Provide specific nutrition/hydration education as appropriate  - Include patient/family/caregiver in decisions related to nutrition  Outcome: Progressing

## 2024-11-16 NOTE — DISCHARGE SUMMARY
Discharge Summary - Hospitalist   Name: Tavia Eldridge 79 y.o. female I MRN: [id number]  Unit/Bed#: [facility] 612-01 I Date of Admission: 11/10/2024   Date of Service: 11/16/2024 I Hospital Day: 6     Assessment & Plan  Metabolic encephalopathy  Presented with R sided weakness, confusion, speech difficulty   Noted to be hypoglycemic with glucose in 30s. On arrival   CT head with no acute findings   CTA head and neck with moderate stenoses of the origins of both vertebral arteries, moderate stenosis of the left subclavian artery origin approximately 50%  Seen by neurology given concern for stroke like sx with R sided weakness   MRI brain negative, no acute findings   Likely all in the setting of hypoglycemia.  Sx have now resolved.  See below plans.   PT/OT plan for rehab   Confirmed her home zyprexa dosing is 15 mg QHS   Pt however, with symptoms of depression /lethargy ? Etiology along with poor appetite / exercise/motivation.   Hypoglycemia  Hypoglycemic on arrival and etiology of AMS as above.   With hx of T2DM on Lantus 19 units daily and Prandin outpt   A1C is 5.9. Pt has had poor appetite x months now.  Advise to stop Lantus and  Prandin on discharge and to only monitor with diet for now  Pt and her  report poor oral intake likely due to depression sp loss of child  Resolved   Type 2 diabetes mellitus, with long-term current use of insulin ([facility])  Lab Results   Component Value Date    HGBA1C 5.9 (H) 11/11/2024       Recent Labs     11/15/24  2042 11/16/24  0145 11/16/24  0734 11/16/24  1107   POCGLU 196* 163* 164* 254*       Blood Sugar Average: Last 72 hrs:  (P) [id number]  With hypoglycemia as above  Home regimen: Lantus 19 hs, Repaglinide 1 mg TID  Updated A1c 5.9, pt reports to poor appetite.  Recommend to stop/hold Lantus and Prandin at discharge, and monitor with diet only.  Should f/u with PCP for further long term mgmt.  Pt/ agreeable.   SSI, accu checks while IP.   Chest  pain  Complains of retrosternal chest pressure with shortness of breath and relieved with rest with occasional episodes at rest since a few months  H/o CAD - NSTEMI in 2022, cath with 50% stenosis of proximal RCA  Troponin negative  Cardiology following, no further IP work up, can f/u outpt   Unclear why she is not on BB but BP is on lower side, would hold on starting this for now, defer to outpt providers   Coronary artery disease involving native coronary artery of native heart with angina pectoris (HCC)  As above  Asthma  No exacerbation  Ct home Singulair  Albuterol prn  Primary hypertension  Home regimen: Lisinopril 10 mg daily, currently held with concern initially for possible stroke, will continue to hold with normotension   Weight loss/loss of appetite  Weight loss of around 7 lbs in a few weeks   Progressively worsening loss of appetite, abdominal bloating  CT C/A/P unremarkable with no sign of malignancy  Outpt f/u   Hyperlipidemia  Ct Atorvastatin 40 mg daily  Anemia  Drop in Hb to 10 from baseline of 12/13 in 2023   Iron panel, B12, folate unremarkable  Outpt work up/follow up.  Her hgb has been stable since admission.   Depression  Ct home meds Bupropion  mg daily, Olanzapine 15 mg daily  We had long discussion in regard to motivation and setting short term goals   She has a goal to see her daughter in [place] for thanksgiving - she will try to work to this goal    has preference to take her home as he feels she has not done well previously in rehab   He feels he will be able to do better with her   RN to get restoratvie to assist pt on a short walk now and guide  in assisting her walking for home discharge plan   Paroxysmal SVT (supraventricular tachycardia) (HCC)  H/o paroxysmal SVT  Pt has  been noted to have some tachycardia since yesterday ,   Ordered 1 x dose of albumin for improved pressure HR now in the 90's and doing better   History of subdural hematoma  H/o SDH many years  ago - s/p right frontotemporal craniotomy  CT head obtained on admission with no acute findings   Moderate protein-calorie malnutrition (HCC)  Malnutrition Findings:   Adult Malnutrition type: Chronic illness  Adult Degree of Malnutrition: Malnutrition of moderate degree  Malnutrition Characteristics: Muscle loss, Fat loss, Inadequate energy       360 Statement: Chronic moderate pro, bri malnutrition d/t condition, decreased appetite as evidence by <75% energy intake >  1month, mild muscle and fat loss at temples, orbitals, triceps, interroseous of hands, BMI 17, treated with oral diet, Ensure compact BID, discussed tips of increasing pro, bri intake at home. If appetite doesn't improve may need to consider appetite stimulant.  BMI Findings:  Adult BMI Classifications: Underweight < 18.5  Body mass index is 17.01 kg/m².   Of note -- she had restriction of no chocolate.  She prefers chocolate shakes, likely this was placed in case of plan for IP stress test which can be done outpt.  Will lift this restriction and ordered chocolate instead of vanilla Ensure      Medical Problems       Resolved Problems  Date Reviewed: 11/16/2024   None       Discharging Physician / Practitioner: ROSE Krishnan  PCP: Terrie Aguero MD  Admission Date:   Admission Orders (From admission, onward)       Ordered        11/10/24 0925  INPATIENT ADMISSION  Once                          Discharge Date: 11/16/24    Consultations During Hospital Stay:  Neurology - Dr Potts  Cardiology - Dr Crow    Procedures Performed:   Echo 11/11:  Left Ventricle: Left ventricular cavity size is normal. Wall thickness is mildly increased. There is mild concentric hypertrophy. The left ventricular ejection fraction is 60%. Systolic function is normal. Wall motion is normal. Diastolic function is mildly abnormal, consistent with grade I (abnormal) relaxation.    IVS: There is sigmoid appearance of the septum.    Aortic Valve: There is mild  regurgitation. There is mild stenosis. The aortic valve mean gradient is 15 mmHg. The dimensionless velocity index is 0.61.    Mitral Valve: There is mild annular calcification. There is no evidence of stenosis. There is systolic anterior motion of the anterior leaflet and chordal apparatus. There is a moderately sized, spherical, echogenic, fixed mass.  This was also visualized as a calcified vegetation on prior study.   EKG sinus tachycardia   11/10: ct brain: 1. No acute intracranial hemorrhage, mass effect or edema.   2. Right pterional craniotomy noted.   CTA brain 11/10: 1. Moderate stenoses of the origins of both vertebral arteries with the vessel is patent distally.   2. Moderate stenosis of the left subclavian artery origin approximately 50%.   3. Carotid arterial system is patent with mild atherosclerotic disease.   4. No intracranial aneurysm or major intracranial arterial stenosis.   CT chest abd and pelvis wo iv contrast 11/11: Superior right lower lobe groundglass opacities, likely infectious/inflammatory and possibly due to aspiration.   No acute findings in the abdomen or pelvis.   No evidence of malignancy.   MRI brain 11/11:No acute intracranial pathology.   CT brain 11/14: No acute intracranial abnormality.     Significant Findings / Test Results:   See above     Incidental Findings:   See above        Test Results Pending at Discharge (will require follow up):   none     Outpatient Tests Requested:  Patient needs to fu with pcp in the next week   Pt should call schedule follow up with an endocrinology for further recommendations once feeling better and improved oral intake etc       Complications:  none    Reason for Admission: right sided weakness and hypoglycemia     Hospital Course:   Tavia Eldridge is a 79 y.o. female patient who originally presented to the hospital on 11/10/2024 due to right sided weakness. Pt has a past medical hx of CAD, HTN, Type 2 DM, astham, depression , SVT, HLD, now  presenting with stroke like symptoms. Pt noted to have waxing and waning right sided weakness recurred morning of admission and 3 days prior. Pt was also noted to have some confusion from 6 pm night prior. Her right sided weakness recurred morning of admission also noting dysarthria hence pt was brought to the ED. She also complained of retrosternal chest pressure with sob. This was relieved with rest for the last few months. She still continued to have occasional episode of chest pressure at rest. Pt and her  also report weight loss of around 7 pounds over the last few months and progressively worsening appetite. Pt complained of abdominal bloating, denied fevers. She did report some occasional nausea and vomiting .   Upon arrival pt was seen by the neurology team who reviewed records and imaging. Pt was not a TNKtPa candidate since not in the window. Pt was admitted under the stroke protocol. She was to continue her home dose atorvastatin. She received full dose asa and was started on baby asa daily. Stat ct head was negative for ischemia. It was however, noted that the patient had a blood sugar of 39 for which her symptoms improved with resolution of the hypoglycemia. Pt however, did seem to have a persistent mild right facial droop. And sensory testing  with reduced PP RUE and LLE with no other focal deficits.   Further cuevas reveal no acute findings of stroke. MRI/CTH/JAMAL all negative. CTA demonstrated moderate stenosis of the origins of both vertebral arteries and moderate stenosis of the left subclavian artery at 50%.   For patients chest pain they felt this was atypical. No suggestions of ACS . Recommendations were to continue current medication regimen with no further cuevas indicated . Once patient feeling stronger she could undergo a nuclear stress test. Recommendations to continue baby asa and statin.   Extensive discussion was had with the pt and her  most of the time at the bedside. He reports  "having poor appetite himself due to diabetic diet aide. He states he would make a sandwich and he and his wife would share the sandwich. 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 Since then pt has been rather depressed and not motivated.   As a result she has continued to have a very poor appetite continued to take her diabetic medications. We suspect this to be the etiology behind patients admission. Patient does feel like she wants to work on improved diet and exercise program. She wants to get stronger and has set a short term goal of getting to her daughters house in [place] for Thanksgiving.   It was recommended per physical therapy to undergo inpatient rehab however,  recants prior poor experience and therfore feels that he can take care of her and set up a regimen to get her stronger. During the patients stay patient has a normal A1c blood sugars have been a little labile but we would recommend stopping her diabetic medications for now. We would recommend reevaluation once she is back to a regular eating routine. Pt is rather malnourished. They report taking ensure at home . Here in the hospital she has been on Glucerna. Pt will have VNA at home. We recommend fu with neurology, follow up with pcp in the next week. May require outpt fu with an endocrinologist.       Please see above list of diagnoses and related plan for additional information.     Condition at Discharge: fair    Discharge Day Visit / Exam:   Subjective:  extensive discussion with pt and her  at bedside. He is adamant that he wants to take her home . No desire to take to rehab dt prior poor experience. He feels that he can do this at home with his wife.\" She is so light he can lift her \" They have set a goal to get her to a visit with daughter in [place]. Pt does appear a little more motivated after our discussion.   Vitals: Blood Pressure: 100/60 (11/16/24 " "0652)  Pulse: 100 (11/16/24 0652)  Temperature: 97.9 °F (36.6 °C) (11/16/24 0652)  Temp Source: Oral (11/13/24 1200)  Respirations: 17 (11/16/24 0652)  Height: 5' 3\" (160 cm) (11/11/24 1430)  Weight - Scale: 43.5 kg (96 lb) (11/11/24 1430)  SpO2: 96 % (11/16/24 0652)  Physical Exam  Constitutional:       General: She is not in acute distress.     Appearance: She is ill-appearing. She is not toxic-appearing or diaphoretic.      Comments: cachectic   HENT:      Head: Normocephalic and atraumatic.      Nose: No congestion.   Eyes:      General:         Right eye: No discharge.         Left eye: No discharge.   Cardiovascular:      Rate and Rhythm: Normal rate.      Heart sounds: No murmur heard.     No friction rub. No gallop.   Pulmonary:      Effort: No respiratory distress.      Breath sounds: No stridor. No wheezing, rhonchi or rales.   Chest:      Chest wall: No tenderness.   Abdominal:      General: Abdomen is flat. There is no distension.      Palpations: Abdomen is soft. There is no mass.      Tenderness: There is no abdominal tenderness. There is no guarding or rebound.      Hernia: No hernia is present.   Musculoskeletal:         General: No swelling, tenderness, deformity or signs of injury.      Right lower leg: No edema.      Left lower leg: No edema.   Skin:     Coloration: Skin is not jaundiced or pale.      Findings: No bruising, erythema, lesion or rash.   Neurological:      Mental Status: She is alert and oriented to person, place, and time.   Psychiatric:         Behavior: Behavior normal.      Comments: Very depressed affect           Discussion with Family: Updated  () at bedside.    Discharge instructions/Information to patient and family:   See after visit summary for information provided to patient and family.      Provisions for Follow-Up Care:  See after visit summary for information related to follow-up care and any pertinent home health orders.      Mobility at time of " Discharge:   Basic Mobility Inpatient Raw Score: 16  JH-HLM Goal: 5: Stand one or more mins  JH-HLM Achieved: 6: Walk 10 steps or more  HLM Goal achieved. Continue to encourage appropriate mobility.     Disposition:   Home with VNA Services (Reminder: Complete face to face encounter)    Planned Readmission: no plan for readmission high risk     Discharge Medications:  See after visit summary for reconciled discharge medications provided to patient and/or family.      Administrative Statements   Discharge Statement:  I have spent a total time of 75 minutes in caring for this patient on the day of the visit/encounter. >30 minutes of time was spent on: Diagnostic results, Risks and benefits of tx options, Patient and family education, Importance of tx compliance, Counseling / Coordination of care, Documenting in the medical record, and Reviewing / ordering tests, medicine, procedures  .    **Please Note: This note may have been constructed using a voice recognition system**

## 2024-11-16 NOTE — ASSESSMENT & PLAN NOTE
Ct home meds Bupropion  mg daily, Olanzapine 15 mg daily  We had long discussion in regard to motivation and setting short term goals   She has a goal to see her daughter in [place] for thanksgiving - she will try to work to this goal    has preference to take her home as he feels she has not done well previously in rehab   He feels he will be able to do better with her   RN to get randee to assist pt on a short walk now and guide  in assisting her walking for home discharge plan

## 2024-11-16 NOTE — PLAN OF CARE
Problem: Prexisting or High Potential for Compromised Skin Integrity  Goal: Skin integrity is maintained or improved  Description: INTERVENTIONS:  - Identify patients at risk for skin breakdown  - Assess and monitor skin integrity  - Assess and monitor nutrition and hydration status  - Monitor labs   - Assess for incontinence   - Turn and reposition patient  - Assist with mobility/ambulation  - Relieve pressure over bony prominences  - Avoid friction and shearing  - Provide appropriate hygiene as needed including keeping skin clean and dry  - Evaluate need for skin moisturizer/barrier cream  - Collaborate with interdisciplinary team   - Patient/family teaching  - Consider wound care consult   Outcome: Progressing     Problem: PAIN - ADULT  Goal: Verbalizes/displays adequate comfort level or baseline comfort level  Description: Interventions:  - Encourage patient to monitor pain and request assistance  - Assess pain using appropriate pain scale  - Administer analgesics based on type and severity of pain and evaluate response  - Implement non-pharmacological measures as appropriate and evaluate response  - Consider cultural and social influences on pain and pain management  - Notify physician/advanced practitioner if interventions unsuccessful or patient reports new pain  Outcome: Progressing     Problem: INFECTION - ADULT  Goal: Absence or prevention of progression during hospitalization  Description: INTERVENTIONS:  - Assess and monitor for signs and symptoms of infection  - Monitor lab/diagnostic results  - Monitor all insertion sites, i.e. indwelling lines, tubes, and drains  - Monitor endotracheal if appropriate and nasal secretions for changes in amount and color  - [place] appropriate cooling/warming therapies per order  - Administer medications as ordered  - Instruct and encourage patient and family to use good hand hygiene technique  - Identify and instruct in appropriate isolation precautions for  identified infection/condition  Outcome: Progressing     Problem: SAFETY ADULT  Goal: Patient will remain free of falls  Description: INTERVENTIONS:  - Educate patient/family on patient safety including physical limitations  - Instruct patient to call for assistance with activity   - Consult OT/PT to assist with strengthening/mobility   - Keep Call bell within reach  - Keep bed low and locked with side rails adjusted as appropriate  - Keep care items and personal belongings within reach  - Initiate and maintain comfort rounds  - Make Fall Risk Sign visible to staff  - Offer Toileting every 2 Hours, in advance of need  - Initiate/Maintain alarm  - Obtain necessary fall risk management equipment:   - Apply yellow socks and bracelet for high fall risk patients  - Consider moving patient to room near nurses station  Outcome: Progressing  Goal: Maintain or return to baseline ADL function  Description: INTERVENTIONS:  -  Assess patient's ability to carry out ADLs; assess patient's baseline for ADL function and identify physical deficits which impact ability to perform ADLs (bathing, care of mouth/teeth, toileting, grooming, dressing, etc.)  - Assess/evaluate cause of self-care deficits   - Assess range of motion  - Assess patient's mobility; develop plan if impaired  - Assess patient's need for assistive devices and provide as appropriate  - Encourage maximum independence but intervene and supervise when necessary  - Involve family in performance of ADLs  - Assess for home care needs following discharge   - Consider OT consult to assist with ADL evaluation and planning for discharge  - Provide patient education as appropriate  Outcome: Progressing  Goal: Maintains/Returns to pre admission functional level  Description: INTERVENTIONS:  - Perform AM-PAC 6 Click Basic Mobility/ Daily Activity assessment daily.  - Set and communicate daily mobility goal to care team and patient/family/caregiver.   - Collaborate with  rehabilitation services on mobility goals if consulted  - Perform Range of Motion 3 times a day.  - Reposition patient every 2 hours.  - Dangle patient 3 times a day  - Stand patient 3 times a day  - Ambulate patient 3 times a day  - Out of bed to chair 3 times a day   - Out of bed for meals 3 times a day  - Out of bed for toileting  - Record patient progress and toleration of activity level   Outcome: Progressing     Problem: DISCHARGE PLANNING  Goal: Discharge to home or other facility with appropriate resources  Description: INTERVENTIONS:  - Identify barriers to discharge w/patient and caregiver  - Arrange for needed discharge resources and transportation as appropriate  - Identify discharge learning needs (meds, wound care, etc.)  - Arrange for interpretive services to assist at discharge as needed  - Refer to Case Management Department for coordinating discharge planning if the patient needs post-hospital services based on physician/advanced practitioner order or complex needs related to functional status, cognitive ability, or social support system  Outcome: Progressing     Problem: Knowledge Deficit  Goal: Patient/family/caregiver demonstrates understanding of disease process, treatment plan, medications, and discharge instructions  Description: Complete learning assessment and assess knowledge base.  Interventions:  - Provide teaching at level of understanding  - Provide teaching via preferred learning methods  Outcome: Progressing     Problem: NEUROSENSORY - ADULT  Goal: Achieves stable or improved neurological status  Description: INTERVENTIONS  - Monitor and report changes in neurological status  - Monitor vital signs such as temperature, blood pressure, glucose, and any other labs ordered   - Initiate measures to prevent increased intracranial pressure  - Monitor for seizure activity and implement precautions if appropriate      Outcome: Progressing  Goal: Achieves maximal functionality and self  care  Description: INTERVENTIONS  - Monitor swallowing and airway patency with patient fatigue and changes in neurological status  - Encourage and assist patient to increase activity and self care.   - Encourage visually impaired, hearing impaired and aphasic patients to use assistive/communication devices  Outcome: Progressing     Problem: GASTROINTESTINAL - ADULT  Goal: Maintains adequate nutritional intake  Description: INTERVENTIONS:  - Monitor percentage of each meal consumed  - Identify factors contributing to decreased intake, treat as appropriate  - Assist with meals as needed  - Monitor I&O, weight, and lab values if indicated  - Obtain nutrition services referral as needed  Outcome: Progressing     Problem: METABOLIC, FLUID AND ELECTROLYTES - ADULT  Goal: Electrolytes maintained within normal limits  Description: INTERVENTIONS:  - Monitor labs and assess patient for signs and symptoms of electrolyte imbalances  - Administer electrolyte replacement as ordered  - Monitor response to electrolyte replacements, including repeat lab results as appropriate  - Instruct patient on fluid and nutrition as appropriate  Outcome: Progressing  Goal: Fluid balance maintained  Description: INTERVENTIONS:  - Monitor labs   - Monitor I/O and WT  - Instruct patient on fluid and nutrition as appropriate  - Assess for signs & symptoms of volume excess or deficit  Outcome: Progressing  Goal: Glucose maintained within target range  Description: INTERVENTIONS:  - Monitor Blood Glucose as ordered  - Assess for signs and symptoms of hyperglycemia and hypoglycemia  - Administer ordered medications to maintain glucose within target range  - Assess nutritional intake and initiate nutrition service referral as needed  Outcome: Progressing     Problem: Nutrition/Hydration-ADULT  Goal: Nutrient/Hydration intake appropriate for improving, restoring or maintaining nutritional needs  Description: Monitor and assess patient's  nutrition/hydration status for malnutrition. Collaborate with interdisciplinary team and initiate plan and interventions as ordered.  Monitor patient's weight and dietary intake as ordered or per policy. Utilize nutrition screening tool and intervene as necessary. Determine patient's food preferences and provide high-protein, high-caloric foods as appropriate.     INTERVENTIONS:  - Monitor oral intake, urinary output, labs, and treatment plans  - Assess nutrition and hydration status and recommend course of action  - Evaluate amount of meals eaten  - Assist patient with eating if necessary   - Allow adequate time for meals  - Recommend/ encourage appropriate diets, oral nutritional supplements, and vitamin/mineral supplements  - Order, calculate, and assess calorie counts as needed  - Recommend, monitor, and adjust tube feedings and TPN/PPN based on assessed needs  - Assess need for intravenous fluids  - Provide specific nutrition/hydration education as appropriate  - Include patient/family/caregiver in decisions related to nutrition  Outcome: Progressing

## 2024-11-16 NOTE — PROGRESS NOTES
Patient:  YULIET THOMAS    MRN:  [id number]    Aidin Request ID:  [id number]    Level of care reserved:  Home Health Agency    Partner Reserved:  [facility], [place] [address] [phone]    Clinical needs requested:    Geography searched:  [phone]    Start of Service:    Request sent:  4:33pm EST on 11/15/2024 by Davy Garza    Partner reserved:  10:41am EST on 11/16/2024 by Opal Vera    Choice list shared:  9:56am EST on 11/16/2024 by Opal Vera

## 2024-11-16 NOTE — ASSESSMENT & PLAN NOTE
Lab Results   Component Value Date    HGBA1C 5.9 (H) 11/11/2024       Recent Labs     11/15/24  2042 11/16/24  0145 11/16/24  0734 11/16/24  1107   POCGLU 196* 163* 164* 254*       Blood Sugar Average: Last 72 hrs:  (P) [id number]  With hypoglycemia as above  Home regimen: Lantus 19 hs, Repaglinide 1 mg TID  Updated A1c 5.9, pt reports to poor appetite.  Recommend to stop/hold Lantus and Prandin at discharge, and monitor with diet only.  Should f/u with PCP for further long term mgmt.  Pt/ agreeable.   SSI, accu checks while IP.

## 2024-11-19 NOTE — UTILIZATION REVIEW
NOTIFICATION OF ADMISSION DISCHARGE   This is a Notification of Discharge from [facility]. Please be advised that this patient has been discharge from our facility. Below you will find the admission and discharge date and time including the patient’s disposition.   UTILIZATION REVIEW CONTACT:  Pranav Lam  Utilization   Network Utilization Review Department  Phone: [phone] x carefully listen to the prompts. All voicemails are confidential.  Email: [email or web address]     ADMISSION INFORMATION  PRESENTATION DATE: 11/10/2024  8:21 AM  OBERVATION ADMISSION DATE: N/A  INPATIENT ADMISSION DATE: 11/10/24  9:25 AM   DISCHARGE DATE: 11/16/2024  1:53 PM   DISPOSITION:Home with Home Health Care    Network Utilization Review Department  ATTENTION: Please call with any questions or concerns to [phone] and carefully listen to the prompts so that you are directed to the right person. All voicemails are confidential.   For Discharge needs, contact Care Management DC Support Team at [phone] opt. 2  Send all requests for admission clinical reviews, approved or denied determinations and any other requests to dedicated fax number below belonging to the campus where the patient is receiving treatment. List of dedicated fax numbers for the Facilities:  FACILITY NAME UR FAX NUMBER   ADMISSION DENIALS (Administrative/Medical Necessity) [phone]   DISCHARGE SUPPORT TEAM ([facility]) [phone]   PARENT CHILD HEALTH (Maternity/NICU/Pediatrics) [phone]   [facility] [phone]   [facility] [phone]   [facility] [phone]   [facility] [phone]   [facility] [phone]   [facility] [phone]   [facility] [phone]   [facility]  [phone]   [facility] [phone]   [facility] [phone]   [facility] [phone]   [facility] [phone]